# Patient Record
Sex: MALE | Race: WHITE | Employment: OTHER | URBAN - NONMETROPOLITAN AREA
[De-identification: names, ages, dates, MRNs, and addresses within clinical notes are randomized per-mention and may not be internally consistent; named-entity substitution may affect disease eponyms.]

---

## 2020-01-01 LAB
BASOPHILS ABSOLUTE: NORMAL
BASOPHILS RELATIVE PERCENT: NORMAL
EOSINOPHILS ABSOLUTE: NORMAL
EOSINOPHILS RELATIVE PERCENT: NORMAL
HCT VFR BLD CALC: NORMAL %
HEMOGLOBIN: NORMAL
LYMPHOCYTES ABSOLUTE: NORMAL
LYMPHOCYTES RELATIVE PERCENT: NORMAL
MCH RBC QN AUTO: NORMAL PG
MCHC RBC AUTO-ENTMCNC: NORMAL G/DL
MCV RBC AUTO: NORMAL FL
MONOCYTES ABSOLUTE: NORMAL
MONOCYTES RELATIVE PERCENT: NORMAL
NEUTROPHILS ABSOLUTE: NORMAL
NEUTROPHILS RELATIVE PERCENT: NORMAL
PLATELET # BLD: NORMAL 10*3/UL
PMV BLD AUTO: NORMAL FL
RBC # BLD: NORMAL 10*6/UL
WBC # BLD: NORMAL 10*3/UL

## 2020-07-15 LAB
AVERAGE GLUCOSE: 111
HBA1C MFR BLD: 5.5 %

## 2020-08-03 LAB
ALBUMIN SERPL-MCNC: NORMAL G/DL
ALP BLD-CCNC: NORMAL U/L
ALT SERPL-CCNC: NORMAL U/L
ANION GAP SERPL CALCULATED.3IONS-SCNC: NORMAL MMOL/L
AST SERPL-CCNC: NORMAL U/L
AVERAGE GLUCOSE: 140
BASOPHILS ABSOLUTE: NORMAL
BASOPHILS RELATIVE PERCENT: NORMAL
BILIRUB SERPL-MCNC: NORMAL MG/DL
BUN BLDV-MCNC: NORMAL MG/DL
CALCIUM SERPL-MCNC: NORMAL MG/DL
CHLORIDE BLD-SCNC: NORMAL MMOL/L
CO2: NORMAL
CREAT SERPL-MCNC: NORMAL MG/DL
EOSINOPHILS ABSOLUTE: NORMAL
EOSINOPHILS RELATIVE PERCENT: NORMAL
GFR CALCULATED: NORMAL
GLUCOSE BLD-MCNC: NORMAL MG/DL
HBA1C MFR BLD: 6.5 %
HCT VFR BLD CALC: NORMAL %
HEMOGLOBIN: NORMAL
LYMPHOCYTES ABSOLUTE: NORMAL
LYMPHOCYTES RELATIVE PERCENT: NORMAL
MCH RBC QN AUTO: NORMAL PG
MCHC RBC AUTO-ENTMCNC: NORMAL G/DL
MCV RBC AUTO: NORMAL FL
MONOCYTES ABSOLUTE: NORMAL
MONOCYTES RELATIVE PERCENT: NORMAL
NEUTROPHILS ABSOLUTE: NORMAL
NEUTROPHILS RELATIVE PERCENT: NORMAL
PLATELET # BLD: NORMAL 10*3/UL
PMV BLD AUTO: NORMAL FL
POTASSIUM SERPL-SCNC: NORMAL MMOL/L
RBC # BLD: NORMAL 10*6/UL
SODIUM BLD-SCNC: NORMAL MMOL/L
TOTAL PROTEIN: NORMAL
WBC # BLD: NORMAL 10*3/UL

## 2021-01-01 ENCOUNTER — TELEPHONE (OUTPATIENT)
Dept: FAMILY MEDICINE CLINIC | Age: 71
End: 2021-01-01

## 2021-01-01 ENCOUNTER — TELEPHONE (OUTPATIENT)
Dept: ADMINISTRATIVE | Age: 71
End: 2021-01-01

## 2021-01-01 ENCOUNTER — OFFICE VISIT (OUTPATIENT)
Dept: FAMILY MEDICINE CLINIC | Age: 71
End: 2021-01-01
Payer: MEDICARE

## 2021-01-01 ENCOUNTER — APPOINTMENT (OUTPATIENT)
Dept: ULTRASOUND IMAGING | Age: 71
DRG: 442 | End: 2021-01-01
Payer: MEDICARE

## 2021-01-01 ENCOUNTER — HOSPITAL ENCOUNTER (INPATIENT)
Age: 71
LOS: 3 days | Discharge: HOME HEALTH | DRG: 442 | End: 2021-04-15
Attending: EMERGENCY MEDICINE | Admitting: FAMILY MEDICINE
Payer: MEDICARE

## 2021-01-01 ENCOUNTER — CARE COORDINATION (OUTPATIENT)
Dept: CASE MANAGEMENT | Age: 71
End: 2021-01-01

## 2021-01-01 ENCOUNTER — IMMUNIZATION (OUTPATIENT)
Dept: PRIMARY CARE CLINIC | Age: 71
End: 2021-01-01
Payer: MEDICARE

## 2021-01-01 ENCOUNTER — APPOINTMENT (OUTPATIENT)
Dept: GENERAL RADIOLOGY | Age: 71
DRG: 442 | End: 2021-01-01
Payer: MEDICARE

## 2021-01-01 ENCOUNTER — APPOINTMENT (OUTPATIENT)
Dept: CT IMAGING | Age: 71
DRG: 442 | End: 2021-01-01
Payer: MEDICARE

## 2021-01-01 ENCOUNTER — OFFICE VISIT (OUTPATIENT)
Dept: PODIATRY | Age: 71
End: 2021-01-01
Payer: MEDICARE

## 2021-01-01 VITALS
BODY MASS INDEX: 27.85 KG/M2 | OXYGEN SATURATION: 94 % | TEMPERATURE: 97.3 F | DIASTOLIC BLOOD PRESSURE: 54 MMHG | HEART RATE: 68 BPM | SYSTOLIC BLOOD PRESSURE: 106 MMHG | HEIGHT: 72 IN | RESPIRATION RATE: 16 BRPM | WEIGHT: 205.6 LBS

## 2021-01-01 VITALS
HEART RATE: 58 BPM | DIASTOLIC BLOOD PRESSURE: 60 MMHG | SYSTOLIC BLOOD PRESSURE: 100 MMHG | WEIGHT: 213.7 LBS | RESPIRATION RATE: 16 BRPM | BODY MASS INDEX: 27.42 KG/M2 | HEIGHT: 74 IN | TEMPERATURE: 98.1 F | OXYGEN SATURATION: 97 %

## 2021-01-01 VITALS
OXYGEN SATURATION: 97 % | BODY MASS INDEX: 27.72 KG/M2 | DIASTOLIC BLOOD PRESSURE: 56 MMHG | WEIGHT: 216 LBS | TEMPERATURE: 97.2 F | SYSTOLIC BLOOD PRESSURE: 92 MMHG | HEART RATE: 56 BPM | HEIGHT: 74 IN

## 2021-01-01 VITALS
SYSTOLIC BLOOD PRESSURE: 114 MMHG | TEMPERATURE: 96.9 F | OXYGEN SATURATION: 98 % | WEIGHT: 225 LBS | BODY MASS INDEX: 29.82 KG/M2 | HEART RATE: 76 BPM | HEIGHT: 73 IN | DIASTOLIC BLOOD PRESSURE: 66 MMHG

## 2021-01-01 VITALS — HEIGHT: 74 IN | BODY MASS INDEX: 27.34 KG/M2 | WEIGHT: 213 LBS

## 2021-01-01 DIAGNOSIS — Z87.19 HISTORY OF CIRRHOSIS: ICD-10-CM

## 2021-01-01 DIAGNOSIS — E11.9 TYPE 2 DIABETES MELLITUS WITHOUT COMPLICATION, UNSPECIFIED WHETHER LONG TERM INSULIN USE (HCC): ICD-10-CM

## 2021-01-01 DIAGNOSIS — E11.42 TYPE 2 DIABETES MELLITUS WITH DIABETIC POLYNEUROPATHY, WITHOUT LONG-TERM CURRENT USE OF INSULIN (HCC): ICD-10-CM

## 2021-01-01 DIAGNOSIS — R18.8 OTHER ASCITES: Primary | ICD-10-CM

## 2021-01-01 DIAGNOSIS — B35.1 TINEA UNGUIUM: Primary | ICD-10-CM

## 2021-01-01 DIAGNOSIS — L84 CORNS AND CALLOSITIES: ICD-10-CM

## 2021-01-01 DIAGNOSIS — K74.60 ADVANCED CIRRHOSIS OF LIVER (HCC): Primary | ICD-10-CM

## 2021-01-01 DIAGNOSIS — K76.82 HEPATIC ENCEPHALOPATHY: Primary | ICD-10-CM

## 2021-01-01 DIAGNOSIS — M20.42 HAMMER TOES OF BOTH FEET: ICD-10-CM

## 2021-01-01 DIAGNOSIS — Z00.00 ROUTINE GENERAL MEDICAL EXAMINATION AT A HEALTH CARE FACILITY: Primary | ICD-10-CM

## 2021-01-01 DIAGNOSIS — K76.82 HEPATIC ENCEPHALOPATHY: ICD-10-CM

## 2021-01-01 DIAGNOSIS — G60.8 HEREDITARY SENSORY NEUROPATHY: ICD-10-CM

## 2021-01-01 DIAGNOSIS — B35.1 ONYCHOMYCOSIS: ICD-10-CM

## 2021-01-01 DIAGNOSIS — M20.41 HAMMER TOES OF BOTH FEET: ICD-10-CM

## 2021-01-01 DIAGNOSIS — Z91.81 AT HIGH RISK FOR FALLS: ICD-10-CM

## 2021-01-01 DIAGNOSIS — R18.8 OTHER ASCITES: ICD-10-CM

## 2021-01-01 DIAGNOSIS — E78.00 HYPERCHOLESTEROLEMIA: ICD-10-CM

## 2021-01-01 LAB
AFP-TUMOR MARKER: 3 NG/ML (ref 0–9)
ALBUMIN FLUID: 0.3 G/DL
ALBUMIN SERPL-MCNC: 2.6 G/DL (ref 3.5–5.2)
ALBUMIN SERPL-MCNC: 2.8 G/DL (ref 3.5–5.2)
ALBUMIN SERPL-MCNC: 2.8 G/DL (ref 3.5–5.2)
ALBUMIN SERPL-MCNC: 2.9 G/DL (ref 3.5–5.2)
ALBUMIN SERPL-MCNC: 3.1 G/DL (ref 3.5–5.2)
ALP BLD-CCNC: 172 U/L (ref 40–129)
ALP BLD-CCNC: 197 U/L (ref 40–129)
ALP BLD-CCNC: 214 U/L (ref 40–129)
ALP BLD-CCNC: 269 U/L (ref 40–129)
ALP BLD-CCNC: 315 U/L (ref 40–129)
ALT SERPL-CCNC: 21 U/L (ref 0–40)
ALT SERPL-CCNC: 21 U/L (ref 0–40)
ALT SERPL-CCNC: 27 U/L (ref 0–40)
ALT SERPL-CCNC: 30 U/L (ref 0–40)
ALT SERPL-CCNC: 32 U/L (ref 0–40)
AMMONIA: 30.2 UMOL/L (ref 16–60)
AMMONIA: 71.4 UMOL/L (ref 16–60)
AMMONIA: 75.8 UMOL/L (ref 16–60)
AMMONIA: 78 UMOL/L (ref 16–60)
AMMONIA: 80.9 UMOL/L (ref 16–60)
AMYLASE FLUID: 18 U/L
ANION GAP SERPL CALCULATED.3IONS-SCNC: 16 MMOL/L (ref 7–16)
ANION GAP SERPL CALCULATED.3IONS-SCNC: 7 MMOL/L (ref 7–16)
ANION GAP SERPL CALCULATED.3IONS-SCNC: 8 MMOL/L (ref 7–16)
ANION GAP SERPL CALCULATED.3IONS-SCNC: 9 MMOL/L (ref 7–16)
ANION GAP SERPL CALCULATED.3IONS-SCNC: 9 MMOL/L (ref 7–16)
ANISOCYTOSIS: ABNORMAL
APPEARANCE FLUID: CLEAR
APTT: 42.2 SEC (ref 24.5–35.1)
APTT: 45 SEC (ref 24.5–35.1)
APTT: 45.6 SEC (ref 24.5–35.1)
AST SERPL-CCNC: 28 U/L (ref 0–39)
AST SERPL-CCNC: 30 U/L (ref 0–39)
AST SERPL-CCNC: 35 U/L (ref 0–39)
AST SERPL-CCNC: 45 U/L (ref 0–39)
AST SERPL-CCNC: 53 U/L (ref 0–39)
BASOPHILS ABSOLUTE: 0.02 E9/L (ref 0–0.2)
BASOPHILS ABSOLUTE: 0.03 E9/L (ref 0–0.2)
BASOPHILS ABSOLUTE: 0.03 E9/L (ref 0–0.2)
BASOPHILS ABSOLUTE: 0.04 E9/L (ref 0–0.2)
BASOPHILS ABSOLUTE: 0.04 E9/L (ref 0–0.2)
BASOPHILS RELATIVE PERCENT: 0.5 % (ref 0–2)
BASOPHILS RELATIVE PERCENT: 0.5 % (ref 0–2)
BASOPHILS RELATIVE PERCENT: 0.6 % (ref 0–2)
BASOPHILS RELATIVE PERCENT: 0.7 % (ref 0–2)
BASOPHILS RELATIVE PERCENT: 1 % (ref 0–2)
BILIRUB SERPL-MCNC: 1.3 MG/DL (ref 0–1.2)
BILIRUB SERPL-MCNC: 1.7 MG/DL (ref 0–1.2)
BILIRUB SERPL-MCNC: 1.7 MG/DL (ref 0–1.2)
BILIRUB SERPL-MCNC: 1.8 MG/DL (ref 0–1.2)
BILIRUB SERPL-MCNC: 2 MG/DL (ref 0–1.2)
BILIRUBIN DIRECT: 0.4 MG/DL (ref 0–0.3)
BILIRUBIN DIRECT: 0.4 MG/DL (ref 0–0.3)
BILIRUBIN URINE: NEGATIVE
BILIRUBIN, INDIRECT: 1.3 MG/DL (ref 0–1)
BILIRUBIN, INDIRECT: 1.3 MG/DL (ref 0–1)
BLOOD CULTURE, ROUTINE: NORMAL
BLOOD, URINE: NEGATIVE
BUN BLDV-MCNC: 15 MG/DL (ref 8–23)
BUN BLDV-MCNC: 16 MG/DL (ref 8–23)
BUN BLDV-MCNC: 17 MG/DL (ref 8–23)
BUN BLDV-MCNC: 18 MG/DL (ref 8–23)
BUN BLDV-MCNC: 22 MG/DL (ref 6–23)
BUN BLDV-MCNC: NORMAL MG/DL
CALCIUM SERPL-MCNC: 8.5 MG/DL (ref 8.6–10.2)
CALCIUM SERPL-MCNC: 8.6 MG/DL (ref 8.6–10.2)
CALCIUM SERPL-MCNC: 8.7 MG/DL (ref 8.6–10.2)
CALCIUM SERPL-MCNC: 8.8 MG/DL (ref 8.6–10.2)
CALCIUM SERPL-MCNC: 9.2 MG/DL (ref 8.6–10.2)
CALCIUM SERPL-MCNC: NORMAL MG/DL
CEA,FLUID: 0.8 NG/ML
CELL COUNT FLUID TYPE: NORMAL
CHLORIDE BLD-SCNC: 102 MMOL/L (ref 98–107)
CHLORIDE BLD-SCNC: 102 MMOL/L (ref 98–107)
CHLORIDE BLD-SCNC: 103 MMOL/L (ref 98–107)
CHLORIDE BLD-SCNC: 96 MMOL/L (ref 98–107)
CHLORIDE BLD-SCNC: 97 MMOL/L (ref 98–107)
CHLORIDE BLD-SCNC: NORMAL MMOL/L
CHOLESTEROL, TOTAL: 140 MG/DL (ref 0–199)
CLARITY: CLEAR
CO2: 22 MMOL/L (ref 22–29)
CO2: 22 MMOL/L (ref 22–29)
CO2: 25 MMOL/L (ref 22–29)
CO2: 26 MMOL/L (ref 22–29)
CO2: 26 MMOL/L (ref 22–29)
CO2: NORMAL
COLOR FLUID: YELLOW
COLOR: YELLOW
CREAT SERPL-MCNC: 0.8 MG/DL (ref 0.7–1.2)
CREAT SERPL-MCNC: 0.9 MG/DL (ref 0.7–1.2)
CREAT SERPL-MCNC: 1 MG/DL (ref 0.7–1.2)
CREAT SERPL-MCNC: NORMAL MG/DL
CREATININE URINE: 33 MG/DL (ref 40–278)
CULTURE, BLOOD 2: NORMAL
EKG ATRIAL RATE: 80 BPM
EKG P AXIS: 11 DEGREES
EKG P-R INTERVAL: 158 MS
EKG Q-T INTERVAL: 376 MS
EKG QRS DURATION: 98 MS
EKG QTC CALCULATION (BAZETT): 433 MS
EKG R AXIS: -63 DEGREES
EKG T AXIS: 53 DEGREES
EKG VENTRICULAR RATE: 80 BPM
EOSINOPHILS ABSOLUTE: 0.04 E9/L (ref 0.05–0.5)
EOSINOPHILS ABSOLUTE: 0.09 E9/L (ref 0.05–0.5)
EOSINOPHILS ABSOLUTE: 0.09 E9/L (ref 0.05–0.5)
EOSINOPHILS ABSOLUTE: 0.1 E9/L (ref 0.05–0.5)
EOSINOPHILS ABSOLUTE: 0.16 E9/L (ref 0.05–0.5)
EOSINOPHILS RELATIVE PERCENT: 1 % (ref 0–6)
EOSINOPHILS RELATIVE PERCENT: 1.5 % (ref 0–6)
EOSINOPHILS RELATIVE PERCENT: 1.6 % (ref 0–6)
EOSINOPHILS RELATIVE PERCENT: 2.6 % (ref 0–6)
EOSINOPHILS RELATIVE PERCENT: 3.6 % (ref 0–6)
FLUID TYPE: NORMAL
GFR AFRICAN AMERICAN: >60
GFR CALCULATED: NORMAL
GFR NON-AFRICAN AMERICAN: >60 ML/MIN/1.73
GLUCOSE BLD-MCNC: 133 MG/DL (ref 74–99)
GLUCOSE BLD-MCNC: 142 MG/DL (ref 74–99)
GLUCOSE BLD-MCNC: 167 MG/DL (ref 74–99)
GLUCOSE BLD-MCNC: 172 MG/DL (ref 74–99)
GLUCOSE BLD-MCNC: 188 MG/DL (ref 74–99)
GLUCOSE BLD-MCNC: NORMAL MG/DL
GLUCOSE URINE: NEGATIVE MG/DL
HBA1C MFR BLD: 5 % (ref 4–5.6)
HCT VFR BLD CALC: 31.7 % (ref 37–54)
HCT VFR BLD CALC: 33.9 % (ref 37–54)
HCT VFR BLD CALC: 36.3 % (ref 37–54)
HCT VFR BLD CALC: 41.6 % (ref 37–54)
HCT VFR BLD CALC: 42.2 % (ref 37–54)
HCT VFR BLD CALC: NORMAL %
HDLC SERPL-MCNC: 49 MG/DL
HEMOGLOBIN: 10.7 G/DL (ref 12.5–16.5)
HEMOGLOBIN: 11.4 G/DL (ref 12.5–16.5)
HEMOGLOBIN: 12.4 G/DL (ref 12.5–16.5)
HEMOGLOBIN: 13.8 G/DL (ref 12.5–16.5)
HEMOGLOBIN: 14.3 G/DL (ref 12.5–16.5)
HEMOGLOBIN: NORMAL
IMMATURE GRANULOCYTES #: 0.01 E9/L
IMMATURE GRANULOCYTES #: 0.02 E9/L
IMMATURE GRANULOCYTES #: 0.03 E9/L
IMMATURE GRANULOCYTES #: 0.03 E9/L
IMMATURE GRANULOCYTES %: 0.3 % (ref 0–5)
IMMATURE GRANULOCYTES %: 0.5 % (ref 0–5)
INR BLD: 1.4
INR BLD: 1.7
INR BLD: 1.9
INR BLD: 1.9
KETONES, URINE: NEGATIVE MG/DL
LACTIC ACID: 1.6 MMOL/L (ref 0.5–2.2)
LACTIC ACID: 2.3 MMOL/L (ref 0.5–2.2)
LACTIC ACID: 2.9 MMOL/L (ref 0.5–2.2)
LDL CHOLESTEROL CALCULATED: 73 MG/DL (ref 0–99)
LEUKOCYTE ESTERASE, URINE: NEGATIVE
LYMPHOCYTES ABSOLUTE: 0.39 E9/L (ref 1.5–4)
LYMPHOCYTES ABSOLUTE: 0.6 E9/L (ref 1.5–4)
LYMPHOCYTES ABSOLUTE: 0.69 E9/L (ref 1.5–4)
LYMPHOCYTES ABSOLUTE: 0.72 E9/L (ref 1.5–4)
LYMPHOCYTES ABSOLUTE: 0.75 E9/L (ref 1.5–4)
LYMPHOCYTES RELATIVE PERCENT: 10 % (ref 20–42)
LYMPHOCYTES RELATIVE PERCENT: 12.2 % (ref 20–42)
LYMPHOCYTES RELATIVE PERCENT: 12.6 % (ref 20–42)
LYMPHOCYTES RELATIVE PERCENT: 15.7 % (ref 20–42)
LYMPHOCYTES RELATIVE PERCENT: 16.2 % (ref 20–42)
MCH RBC QN AUTO: 31.8 PG (ref 26–35)
MCH RBC QN AUTO: 32.2 PG (ref 26–35)
MCH RBC QN AUTO: 32.3 PG (ref 26–35)
MCH RBC QN AUTO: 32.6 PG (ref 26–35)
MCH RBC QN AUTO: 32.9 PG (ref 26–35)
MCHC RBC AUTO-ENTMCNC: 32.7 % (ref 32–34.5)
MCHC RBC AUTO-ENTMCNC: 33.6 % (ref 32–34.5)
MCHC RBC AUTO-ENTMCNC: 33.8 % (ref 32–34.5)
MCHC RBC AUTO-ENTMCNC: 34.2 % (ref 32–34.5)
MCHC RBC AUTO-ENTMCNC: 34.4 % (ref 32–34.5)
MCV RBC AUTO: 95.5 FL (ref 80–99.9)
MCV RBC AUTO: 95.5 FL (ref 80–99.9)
MCV RBC AUTO: 95.9 FL (ref 80–99.9)
MCV RBC AUTO: 96 FL (ref 80–99.9)
MCV RBC AUTO: 97.2 FL (ref 80–99.9)
METER GLUCOSE: 117 MG/DL (ref 74–99)
METER GLUCOSE: 119 MG/DL (ref 74–99)
METER GLUCOSE: 128 MG/DL (ref 74–99)
METER GLUCOSE: 133 MG/DL (ref 74–99)
METER GLUCOSE: 137 MG/DL (ref 74–99)
METER GLUCOSE: 142 MG/DL (ref 74–99)
METER GLUCOSE: 157 MG/DL (ref 74–99)
METER GLUCOSE: 159 MG/DL (ref 74–99)
METER GLUCOSE: 169 MG/DL (ref 74–99)
METER GLUCOSE: 213 MG/DL (ref 74–99)
MICROALBUMIN UR-MCNC: <12 MG/L
MICROALBUMIN/CREAT UR-RTO: ABNORMAL (ref 0–30)
MONOCYTE, FLUID: 99 %
MONOCYTES ABSOLUTE: 0.62 E9/L (ref 0.1–0.95)
MONOCYTES ABSOLUTE: 0.72 E9/L (ref 0.1–0.95)
MONOCYTES ABSOLUTE: 0.72 E9/L (ref 0.1–0.95)
MONOCYTES ABSOLUTE: 0.91 E9/L (ref 0.1–0.95)
MONOCYTES ABSOLUTE: 0.99 E9/L (ref 0.1–0.95)
MONOCYTES RELATIVE PERCENT: 16 % (ref 2–12)
MONOCYTES RELATIVE PERCENT: 16 % (ref 2–12)
MONOCYTES RELATIVE PERCENT: 16.2 % (ref 2–12)
MONOCYTES RELATIVE PERCENT: 16.7 % (ref 2–12)
MONOCYTES RELATIVE PERCENT: 18.9 % (ref 2–12)
NEUTROPHIL, FLUID: 1 %
NEUTROPHILS ABSOLUTE: 2.36 E9/L (ref 1.8–7.3)
NEUTROPHILS ABSOLUTE: 2.79 E9/L (ref 1.8–7.3)
NEUTROPHILS ABSOLUTE: 2.81 E9/L (ref 1.8–7.3)
NEUTROPHILS ABSOLUTE: 3.71 E9/L (ref 1.8–7.3)
NEUTROPHILS ABSOLUTE: 4.27 E9/L (ref 1.8–7.3)
NEUTROPHILS RELATIVE PERCENT: 62 % (ref 43–80)
NEUTROPHILS RELATIVE PERCENT: 62.8 % (ref 43–80)
NEUTROPHILS RELATIVE PERCENT: 68.1 % (ref 43–80)
NEUTROPHILS RELATIVE PERCENT: 69.2 % (ref 43–80)
NEUTROPHILS RELATIVE PERCENT: 72 % (ref 43–80)
NITRITE, URINE: NEGATIVE
NUCLEATED CELLS FLUID: 97 /UL
PDW BLD-RTO: 15.7 FL (ref 11.5–15)
PDW BLD-RTO: 16.5 FL (ref 11.5–15)
PDW BLD-RTO: 16.7 FL (ref 11.5–15)
PDW BLD-RTO: 16.7 FL (ref 11.5–15)
PDW BLD-RTO: 16.8 FL (ref 11.5–15)
PH UA: 5.5 (ref 5–9)
PLATELET # BLD: 118 E9/L (ref 130–450)
PLATELET # BLD: 119 E9/L (ref 130–450)
PLATELET # BLD: 71 E9/L (ref 130–450)
PLATELET # BLD: 76 E9/L (ref 130–450)
PLATELET # BLD: 82 E9/L (ref 130–450)
PLATELET # BLD: NORMAL 10*3/UL
PLATELET CONFIRMATION: NORMAL
PMV BLD AUTO: 10.1 FL (ref 7–12)
PMV BLD AUTO: 10.2 FL (ref 7–12)
PMV BLD AUTO: 10.4 FL (ref 7–12)
PMV BLD AUTO: 10.7 FL (ref 7–12)
PMV BLD AUTO: 11 FL (ref 7–12)
POTASSIUM REFLEX MAGNESIUM: 3.7 MMOL/L (ref 3.5–5)
POTASSIUM REFLEX MAGNESIUM: 3.7 MMOL/L (ref 3.5–5)
POTASSIUM REFLEX MAGNESIUM: 4 MMOL/L (ref 3.5–5)
POTASSIUM REFLEX MAGNESIUM: 4.2 MMOL/L (ref 3.5–5)
POTASSIUM SERPL-SCNC: 4.4 MMOL/L (ref 3.5–5)
POTASSIUM SERPL-SCNC: NORMAL MMOL/L
PROTEIN FLUID: 0.7 G/DL
PROTEIN UA: NEGATIVE MG/DL
PROTHROMBIN TIME: 18.4 SEC (ref 9.3–12.4)
PROTHROMBIN TIME: 20 SEC (ref 9.3–12.4)
PROTHROMBIN TIME: 20.7 SEC (ref 9.3–12.4)
PROTIME: 13.7 SECONDS
RBC # BLD: 3.32 E12/L (ref 3.8–5.8)
RBC # BLD: 3.53 E12/L (ref 3.8–5.8)
RBC # BLD: 3.8 E12/L (ref 3.8–5.8)
RBC # BLD: 4.34 E12/L (ref 3.8–5.8)
RBC # BLD: 4.34 E12/L (ref 3.8–5.8)
RBC # BLD: NORMAL 10*6/UL
RBC FLUID: <2000 /UL
SARS-COV-2: NEGATIVE
SARS-COV-2: NEGATIVE
SODIUM BLD-SCNC: 132 MMOL/L (ref 132–146)
SODIUM BLD-SCNC: 134 MMOL/L (ref 132–146)
SODIUM BLD-SCNC: 136 MMOL/L (ref 132–146)
SODIUM BLD-SCNC: NORMAL MMOL/L
SPECIFIC GRAVITY UA: 1.02 (ref 1–1.03)
TOTAL PROTEIN: 4.9 G/DL (ref 6.4–8.3)
TOTAL PROTEIN: 5 G/DL (ref 6.4–8.3)
TOTAL PROTEIN: 5.3 G/DL (ref 6.4–8.3)
TOTAL PROTEIN: 6.3 G/DL (ref 6.4–8.3)
TOTAL PROTEIN: 6.4 G/DL (ref 6.4–8.3)
TRIGL SERPL-MCNC: 91 MG/DL (ref 0–149)
TROPONIN: <0.01 NG/ML (ref 0–0.03)
URINE CULTURE, ROUTINE: NORMAL
UROBILINOGEN, URINE: 2 E.U./DL
VLDLC SERPL CALC-MCNC: 18 MG/DL
WBC # BLD: 3.8 E9/L (ref 4.5–11.5)
WBC # BLD: 3.9 E9/L (ref 4.5–11.5)
WBC # BLD: 4.4 E9/L (ref 4.5–11.5)
WBC # BLD: 5.5 E9/L (ref 4.5–11.5)
WBC # BLD: 6.2 E9/L (ref 4.5–11.5)
WBC # BLD: NORMAL 10*3/UL

## 2021-01-01 PROCEDURE — 80076 HEPATIC FUNCTION PANEL: CPT

## 2021-01-01 PROCEDURE — 1036F TOBACCO NON-USER: CPT | Performed by: FAMILY MEDICINE

## 2021-01-01 PROCEDURE — 80053 COMPREHEN METABOLIC PANEL: CPT

## 2021-01-01 PROCEDURE — 2580000003 HC RX 258: Performed by: FAMILY MEDICINE

## 2021-01-01 PROCEDURE — 88305 TISSUE EXAM BY PATHOLOGIST: CPT

## 2021-01-01 PROCEDURE — 99203 OFFICE O/P NEW LOW 30 MIN: CPT | Performed by: PODIATRIST

## 2021-01-01 PROCEDURE — 85730 THROMBOPLASTIN TIME PARTIAL: CPT

## 2021-01-01 PROCEDURE — 99222 1ST HOSP IP/OBS MODERATE 55: CPT | Performed by: FAMILY MEDICINE

## 2021-01-01 PROCEDURE — 6370000000 HC RX 637 (ALT 250 FOR IP): Performed by: FAMILY MEDICINE

## 2021-01-01 PROCEDURE — 4040F PNEUMOC VAC/ADMIN/RCVD: CPT | Performed by: PODIATRIST

## 2021-01-01 PROCEDURE — G8427 DOCREV CUR MEDS BY ELIG CLIN: HCPCS | Performed by: PODIATRIST

## 2021-01-01 PROCEDURE — 1111F DSCHRG MED/CURRENT MED MERGE: CPT | Performed by: FAMILY MEDICINE

## 2021-01-01 PROCEDURE — 1036F TOBACCO NON-USER: CPT | Performed by: PODIATRIST

## 2021-01-01 PROCEDURE — 89051 BODY FLUID CELL COUNT: CPT

## 2021-01-01 PROCEDURE — 82042 OTHER SOURCE ALBUMIN QUAN EA: CPT

## 2021-01-01 PROCEDURE — 1123F ACP DISCUSS/DSCN MKR DOCD: CPT | Performed by: FAMILY MEDICINE

## 2021-01-01 PROCEDURE — 11056 PARNG/CUTG B9 HYPRKR LES 2-4: CPT | Performed by: PODIATRIST

## 2021-01-01 PROCEDURE — G8417 CALC BMI ABV UP PARAM F/U: HCPCS | Performed by: FAMILY MEDICINE

## 2021-01-01 PROCEDURE — 82150 ASSAY OF AMYLASE: CPT

## 2021-01-01 PROCEDURE — 6360000002 HC RX W HCPCS: Performed by: CLINICAL NURSE SPECIALIST

## 2021-01-01 PROCEDURE — 0W9G3ZZ DRAINAGE OF PERITONEAL CAVITY, PERCUTANEOUS APPROACH: ICD-10-PCS | Performed by: RADIOLOGY

## 2021-01-01 PROCEDURE — 70450 CT HEAD/BRAIN W/O DYE: CPT

## 2021-01-01 PROCEDURE — 85025 COMPLETE CBC W/AUTO DIFF WBC: CPT

## 2021-01-01 PROCEDURE — 83605 ASSAY OF LACTIC ACID: CPT

## 2021-01-01 PROCEDURE — 36415 COLL VENOUS BLD VENIPUNCTURE: CPT

## 2021-01-01 PROCEDURE — 99232 SBSQ HOSP IP/OBS MODERATE 35: CPT | Performed by: FAMILY MEDICINE

## 2021-01-01 PROCEDURE — 82140 ASSAY OF AMMONIA: CPT

## 2021-01-01 PROCEDURE — 88112 CYTOPATH CELL ENHANCE TECH: CPT

## 2021-01-01 PROCEDURE — 4040F PNEUMOC VAC/ADMIN/RCVD: CPT | Performed by: FAMILY MEDICINE

## 2021-01-01 PROCEDURE — 3017F COLORECTAL CA SCREEN DOC REV: CPT | Performed by: PODIATRIST

## 2021-01-01 PROCEDURE — 3046F HEMOGLOBIN A1C LEVEL >9.0%: CPT | Performed by: PODIATRIST

## 2021-01-01 PROCEDURE — 87088 URINE BACTERIA CULTURE: CPT

## 2021-01-01 PROCEDURE — 85610 PROTHROMBIN TIME: CPT

## 2021-01-01 PROCEDURE — 91301 COVID-19, MODERNA VACCINE 100MCG/0.5ML DOSE: CPT | Performed by: NURSE PRACTITIONER

## 2021-01-01 PROCEDURE — 1111F DSCHRG MED/CURRENT MED MERGE: CPT | Performed by: PODIATRIST

## 2021-01-01 PROCEDURE — 0012A COVID-19, MODERNA VACCINE 100MCG/0.5ML DOSE: CPT | Performed by: NURSE PRACTITIONER

## 2021-01-01 PROCEDURE — 2022F DILAT RTA XM EVC RTNOPTHY: CPT | Performed by: PODIATRIST

## 2021-01-01 PROCEDURE — 2060000000 HC ICU INTERMEDIATE R&B

## 2021-01-01 PROCEDURE — 81003 URINALYSIS AUTO W/O SCOPE: CPT

## 2021-01-01 PROCEDURE — 6370000000 HC RX 637 (ALT 250 FOR IP): Performed by: CLINICAL NURSE SPECIALIST

## 2021-01-01 PROCEDURE — 76705 ECHO EXAM OF ABDOMEN: CPT

## 2021-01-01 PROCEDURE — C1729 CATH, DRAINAGE: HCPCS

## 2021-01-01 PROCEDURE — G8427 DOCREV CUR MEDS BY ELIG CLIN: HCPCS | Performed by: FAMILY MEDICINE

## 2021-01-01 PROCEDURE — 3017F COLORECTAL CA SCREEN DOC REV: CPT | Performed by: FAMILY MEDICINE

## 2021-01-01 PROCEDURE — 82962 GLUCOSE BLOOD TEST: CPT

## 2021-01-01 PROCEDURE — P9047 ALBUMIN (HUMAN), 25%, 50ML: HCPCS | Performed by: CLINICAL NURSE SPECIALIST

## 2021-01-01 PROCEDURE — P9612 CATHETERIZE FOR URINE SPEC: HCPCS

## 2021-01-01 PROCEDURE — 1123F ACP DISCUSS/DSCN MKR DOCD: CPT | Performed by: PODIATRIST

## 2021-01-01 PROCEDURE — 99238 HOSP IP/OBS DSCHRG MGMT 30/<: CPT | Performed by: FAMILY MEDICINE

## 2021-01-01 PROCEDURE — 6370000000 HC RX 637 (ALT 250 FOR IP): Performed by: STUDENT IN AN ORGANIZED HEALTH CARE EDUCATION/TRAINING PROGRAM

## 2021-01-01 PROCEDURE — 0011A COVID-19, MODERNA VACCINE 100MCG/0.5ML DOSE: CPT | Performed by: NURSE PRACTITIONER

## 2021-01-01 PROCEDURE — 84157 ASSAY OF PROTEIN OTHER: CPT

## 2021-01-01 PROCEDURE — 99282 EMERGENCY DEPT VISIT SF MDM: CPT

## 2021-01-01 PROCEDURE — 99204 OFFICE O/P NEW MOD 45 MIN: CPT | Performed by: FAMILY MEDICINE

## 2021-01-01 PROCEDURE — 2022F DILAT RTA XM EVC RTNOPTHY: CPT | Performed by: FAMILY MEDICINE

## 2021-01-01 PROCEDURE — G0438 PPPS, INITIAL VISIT: HCPCS | Performed by: FAMILY MEDICINE

## 2021-01-01 PROCEDURE — 82105 ALPHA-FETOPROTEIN SERUM: CPT

## 2021-01-01 PROCEDURE — 93005 ELECTROCARDIOGRAM TRACING: CPT | Performed by: STUDENT IN AN ORGANIZED HEALTH CARE EDUCATION/TRAINING PROGRAM

## 2021-01-01 PROCEDURE — 3046F HEMOGLOBIN A1C LEVEL >9.0%: CPT | Performed by: FAMILY MEDICINE

## 2021-01-01 PROCEDURE — 71045 X-RAY EXAM CHEST 1 VIEW: CPT

## 2021-01-01 PROCEDURE — 82378 CARCINOEMBRYONIC ANTIGEN: CPT

## 2021-01-01 PROCEDURE — 11721 DEBRIDE NAIL 6 OR MORE: CPT | Performed by: PODIATRIST

## 2021-01-01 PROCEDURE — 87040 BLOOD CULTURE FOR BACTERIA: CPT

## 2021-01-01 PROCEDURE — 84484 ASSAY OF TROPONIN QUANT: CPT

## 2021-01-01 PROCEDURE — 99495 TRANSJ CARE MGMT MOD F2F 14D: CPT | Performed by: FAMILY MEDICINE

## 2021-01-01 PROCEDURE — G8417 CALC BMI ABV UP PARAM F/U: HCPCS | Performed by: PODIATRIST

## 2021-01-01 RX ORDER — ACETAMINOPHEN 650 MG/1
650 SUPPOSITORY RECTAL EVERY 6 HOURS PRN
Status: DISCONTINUED | OUTPATIENT
Start: 2021-01-01 | End: 2021-01-01 | Stop reason: HOSPADM

## 2021-01-01 RX ORDER — OMEPRAZOLE 20 MG/1
20 CAPSULE, DELAYED RELEASE ORAL DAILY
COMMUNITY
Start: 2021-01-01

## 2021-01-01 RX ORDER — FUROSEMIDE 40 MG/1
40 TABLET ORAL EVERY MORNING
Status: DISCONTINUED | OUTPATIENT
Start: 2021-01-01 | End: 2021-01-01 | Stop reason: HOSPADM

## 2021-01-01 RX ORDER — LACTULOSE 10 G/15ML
20 SOLUTION ORAL ONCE
Status: COMPLETED | OUTPATIENT
Start: 2021-01-01 | End: 2021-01-01

## 2021-01-01 RX ORDER — SODIUM CHLORIDE 0.9 % (FLUSH) 0.9 %
5-40 SYRINGE (ML) INJECTION PRN
Status: DISCONTINUED | OUTPATIENT
Start: 2021-01-01 | End: 2021-01-01 | Stop reason: HOSPADM

## 2021-01-01 RX ORDER — ATORVASTATIN CALCIUM 40 MG/1
40 TABLET, FILM COATED ORAL DAILY
Qty: 90 TABLET | Refills: 2 | Status: SHIPPED | OUTPATIENT
Start: 2021-01-01 | End: 2021-01-01

## 2021-01-01 RX ORDER — ATORVASTATIN CALCIUM 40 MG/1
40 TABLET, FILM COATED ORAL DAILY
COMMUNITY
Start: 2021-01-01 | Stop reason: SDUPTHER

## 2021-01-01 RX ORDER — LACTULOSE 10 G/15ML
30 SOLUTION ORAL EVERY 6 HOURS SCHEDULED
Status: DISCONTINUED | OUTPATIENT
Start: 2021-01-01 | End: 2021-01-01 | Stop reason: HOSPADM

## 2021-01-01 RX ORDER — FUROSEMIDE 20 MG/1
20 TABLET ORAL NIGHTLY
Status: DISCONTINUED | OUTPATIENT
Start: 2021-01-01 | End: 2021-01-01 | Stop reason: HOSPADM

## 2021-01-01 RX ORDER — SODIUM CHLORIDE 0.9 % (FLUSH) 0.9 %
5-40 SYRINGE (ML) INJECTION EVERY 12 HOURS SCHEDULED
Status: DISCONTINUED | OUTPATIENT
Start: 2021-01-01 | End: 2021-01-01 | Stop reason: HOSPADM

## 2021-01-01 RX ORDER — FUROSEMIDE 40 MG/1
40 TABLET ORAL DAILY
COMMUNITY
Start: 2020-01-01

## 2021-01-01 RX ORDER — ACETAMINOPHEN 325 MG/1
650 TABLET ORAL EVERY 6 HOURS PRN
Status: DISCONTINUED | OUTPATIENT
Start: 2021-01-01 | End: 2021-01-01 | Stop reason: HOSPADM

## 2021-01-01 RX ORDER — LACTULOSE 10 G/15ML
SOLUTION ORAL
Qty: 3 BOTTLE | Refills: 2 | Status: ON HOLD
Start: 2021-01-01 | End: 2021-01-01 | Stop reason: SDUPTHER

## 2021-01-01 RX ORDER — PANTOPRAZOLE SODIUM 40 MG/1
40 TABLET, DELAYED RELEASE ORAL
Status: DISCONTINUED | OUTPATIENT
Start: 2021-01-01 | End: 2021-01-01 | Stop reason: HOSPADM

## 2021-01-01 RX ORDER — GLIPIZIDE 10 MG/1
10 TABLET, FILM COATED, EXTENDED RELEASE ORAL
Qty: 90 TABLET | Refills: 1 | Status: SHIPPED
Start: 2021-01-01 | End: 2021-01-01

## 2021-01-01 RX ORDER — LACTULOSE 10 G/15ML
SOLUTION ORAL
Qty: 3 BOTTLE | Refills: 2 | Status: SHIPPED
Start: 2021-01-01 | End: 2021-01-01

## 2021-01-01 RX ORDER — PROMETHAZINE HYDROCHLORIDE 25 MG/1
12.5 TABLET ORAL EVERY 6 HOURS PRN
Status: DISCONTINUED | OUTPATIENT
Start: 2021-01-01 | End: 2021-01-01 | Stop reason: HOSPADM

## 2021-01-01 RX ORDER — GLIPIZIDE 5 MG/1
5 TABLET, FILM COATED, EXTENDED RELEASE ORAL
Qty: 90 TABLET | Refills: 0 | Status: SHIPPED
Start: 2021-01-01 | End: 2021-01-01

## 2021-01-01 RX ORDER — NADOLOL 20 MG/1
20 TABLET ORAL NIGHTLY
COMMUNITY
Start: 2021-01-01

## 2021-01-01 RX ORDER — GLIPIZIDE 5 MG/1
TABLET, FILM COATED, EXTENDED RELEASE ORAL
Qty: 90 TABLET | Refills: 3 | Status: SHIPPED | OUTPATIENT
Start: 2021-01-01

## 2021-01-01 RX ORDER — ATORVASTATIN CALCIUM 40 MG/1
40 TABLET, FILM COATED ORAL DAILY
Qty: 30 TABLET | Refills: 2 | Status: SHIPPED
Start: 2021-01-01 | End: 2021-01-01 | Stop reason: SDUPTHER

## 2021-01-01 RX ORDER — LACTULOSE 10 G/15ML
SOLUTION ORAL
Qty: 3 BOTTLE | Refills: 2 | Status: SHIPPED | OUTPATIENT
Start: 2021-01-01 | End: 2021-01-01 | Stop reason: SDUPTHER

## 2021-01-01 RX ORDER — NADOLOL 20 MG/1
20 TABLET ORAL NIGHTLY
Status: DISCONTINUED | OUTPATIENT
Start: 2021-01-01 | End: 2021-01-01 | Stop reason: HOSPADM

## 2021-01-01 RX ORDER — LACTULOSE 10 G/15ML
SOLUTION ORAL
COMMUNITY
Start: 2021-01-01 | End: 2021-01-01 | Stop reason: SDUPTHER

## 2021-01-01 RX ORDER — GLIPIZIDE 10 MG/1
10 TABLET, FILM COATED, EXTENDED RELEASE ORAL
Qty: 30 TABLET | Refills: 2
Start: 2021-01-01 | End: 2021-01-01 | Stop reason: SDUPTHER

## 2021-01-01 RX ORDER — LACTULOSE 10 G/15ML
30 SOLUTION ORAL 3 TIMES DAILY
Status: DISCONTINUED | OUTPATIENT
Start: 2021-01-01 | End: 2021-01-01

## 2021-01-01 RX ORDER — FUROSEMIDE 20 MG/1
20 TABLET ORAL DAILY
COMMUNITY
Start: 2021-01-01

## 2021-01-01 RX ORDER — ERGOCALCIFEROL (VITAMIN D2) 1250 MCG
50000 CAPSULE ORAL WEEKLY
COMMUNITY
Start: 2021-01-01

## 2021-01-01 RX ORDER — LACTULOSE 10 G/15ML
30 SOLUTION ORAL ONCE
Status: COMPLETED | OUTPATIENT
Start: 2021-01-01 | End: 2021-01-01

## 2021-01-01 RX ORDER — ONDANSETRON 2 MG/ML
4 INJECTION INTRAMUSCULAR; INTRAVENOUS EVERY 6 HOURS PRN
Status: DISCONTINUED | OUTPATIENT
Start: 2021-01-01 | End: 2021-01-01 | Stop reason: HOSPADM

## 2021-01-01 RX ORDER — GLIPIZIDE 10 MG/1
10 TABLET, FILM COATED, EXTENDED RELEASE ORAL 2 TIMES DAILY
COMMUNITY
Start: 2020-04-28 | End: 2021-01-01

## 2021-01-01 RX ORDER — LACTULOSE 10 G/15ML
SOLUTION ORAL
Qty: 8295 ML | Refills: 3 | Status: SHIPPED | OUTPATIENT
Start: 2021-01-01

## 2021-01-01 RX ORDER — SPIRONOLACTONE 50 MG/1
150 TABLET, FILM COATED ORAL DAILY
COMMUNITY
Start: 2021-01-01

## 2021-01-01 RX ORDER — SPIRONOLACTONE 25 MG/1
150 TABLET ORAL DAILY
Status: DISCONTINUED | OUTPATIENT
Start: 2021-01-01 | End: 2021-01-01 | Stop reason: HOSPADM

## 2021-01-01 RX ORDER — ATORVASTATIN CALCIUM 40 MG/1
40 TABLET, FILM COATED ORAL DAILY
Status: DISCONTINUED | OUTPATIENT
Start: 2021-01-01 | End: 2021-01-01 | Stop reason: HOSPADM

## 2021-01-01 RX ORDER — SODIUM CHLORIDE 9 MG/ML
25 INJECTION, SOLUTION INTRAVENOUS PRN
Status: DISCONTINUED | OUTPATIENT
Start: 2021-01-01 | End: 2021-01-01 | Stop reason: HOSPADM

## 2021-01-01 RX ORDER — 0.9 % SODIUM CHLORIDE 0.9 %
500 INTRAVENOUS SOLUTION INTRAVENOUS ONCE
Status: COMPLETED | OUTPATIENT
Start: 2021-01-01 | End: 2021-01-01

## 2021-01-01 RX ORDER — LACTULOSE 10 G/15ML
SOLUTION ORAL
Qty: 8295 ML | Refills: 3 | Status: SHIPPED
Start: 2021-01-01 | End: 2021-01-01 | Stop reason: SDUPTHER

## 2021-01-01 RX ORDER — POLYETHYLENE GLYCOL 3350 17 G/17G
17 POWDER, FOR SOLUTION ORAL DAILY PRN
Status: DISCONTINUED | OUTPATIENT
Start: 2021-01-01 | End: 2021-01-01 | Stop reason: HOSPADM

## 2021-01-01 RX ORDER — ALBUMIN (HUMAN) 12.5 G/50ML
75 SOLUTION INTRAVENOUS SEE ADMIN INSTRUCTIONS
Status: COMPLETED | OUTPATIENT
Start: 2021-01-01 | End: 2021-01-01

## 2021-01-01 RX ADMIN — LACTULOSE 30 G: 20 SOLUTION ORAL at 05:14

## 2021-01-01 RX ADMIN — LACTULOSE 30 G: 20 SOLUTION ORAL at 06:02

## 2021-01-01 RX ADMIN — RIFAXIMIN 550 MG: 550 TABLET ORAL at 08:47

## 2021-01-01 RX ADMIN — RIFAXIMIN 550 MG: 550 TABLET ORAL at 20:48

## 2021-01-01 RX ADMIN — LACTULOSE 30 G: 20 SOLUTION ORAL at 23:36

## 2021-01-01 RX ADMIN — LACTULOSE 30 G: 20 SOLUTION ORAL at 17:27

## 2021-01-01 RX ADMIN — LACTULOSE 30 G: 20 SOLUTION ORAL at 23:21

## 2021-01-01 RX ADMIN — FUROSEMIDE 40 MG: 40 TABLET ORAL at 09:18

## 2021-01-01 RX ADMIN — ATORVASTATIN CALCIUM 40 MG: 40 TABLET, FILM COATED ORAL at 20:00

## 2021-01-01 RX ADMIN — SODIUM CHLORIDE 500 ML: 9 INJECTION, SOLUTION INTRAVENOUS at 02:14

## 2021-01-01 RX ADMIN — PANTOPRAZOLE SODIUM 40 MG: 40 TABLET, DELAYED RELEASE ORAL at 05:14

## 2021-01-01 RX ADMIN — SPIRONOLACTONE 150 MG: 25 TABLET ORAL at 02:14

## 2021-01-01 RX ADMIN — ATORVASTATIN CALCIUM 40 MG: 40 TABLET, FILM COATED ORAL at 20:47

## 2021-01-01 RX ADMIN — RIFAXIMIN 550 MG: 550 TABLET ORAL at 20:00

## 2021-01-01 RX ADMIN — LACTULOSE 20 G: 20 SOLUTION ORAL at 20:52

## 2021-01-01 RX ADMIN — LACTULOSE 30 G: 20 SOLUTION ORAL at 17:07

## 2021-01-01 RX ADMIN — FUROSEMIDE 20 MG: 20 TABLET ORAL at 20:48

## 2021-01-01 RX ADMIN — SODIUM CHLORIDE, PRESERVATIVE FREE 10 ML: 5 INJECTION INTRAVENOUS at 10:27

## 2021-01-01 RX ADMIN — SODIUM CHLORIDE, PRESERVATIVE FREE 10 ML: 5 INJECTION INTRAVENOUS at 20:01

## 2021-01-01 RX ADMIN — LACTULOSE 30 G: 20 SOLUTION ORAL at 17:40

## 2021-01-01 RX ADMIN — ALBUMIN (HUMAN) 75 G: 0.25 INJECTION, SOLUTION INTRAVENOUS at 14:53

## 2021-01-01 RX ADMIN — LACTULOSE 30 G: 20 SOLUTION ORAL at 11:39

## 2021-01-01 RX ADMIN — NADOLOL 20 MG: 20 TABLET ORAL at 20:00

## 2021-01-01 RX ADMIN — SPIRONOLACTONE 150 MG: 25 TABLET ORAL at 10:46

## 2021-01-01 RX ADMIN — FUROSEMIDE 40 MG: 40 TABLET ORAL at 10:27

## 2021-01-01 RX ADMIN — PANTOPRAZOLE SODIUM 40 MG: 40 TABLET, DELAYED RELEASE ORAL at 06:02

## 2021-01-01 RX ADMIN — LACTULOSE 30 G: 20 SOLUTION ORAL at 11:53

## 2021-01-01 RX ADMIN — PANTOPRAZOLE SODIUM 40 MG: 40 TABLET, DELAYED RELEASE ORAL at 05:05

## 2021-01-01 RX ADMIN — FUROSEMIDE 40 MG: 40 TABLET ORAL at 08:47

## 2021-01-01 RX ADMIN — LACTULOSE 30 G: 20 SOLUTION ORAL at 08:47

## 2021-01-01 RX ADMIN — SPIRONOLACTONE 150 MG: 25 TABLET ORAL at 10:55

## 2021-01-01 RX ADMIN — SODIUM CHLORIDE, PRESERVATIVE FREE 10 ML: 5 INJECTION INTRAVENOUS at 20:48

## 2021-01-01 RX ADMIN — RIFAXIMIN 550 MG: 550 TABLET ORAL at 09:18

## 2021-01-01 RX ADMIN — RIFAXIMIN 550 MG: 550 TABLET ORAL at 10:26

## 2021-01-01 RX ADMIN — SODIUM CHLORIDE, PRESERVATIVE FREE 10 ML: 5 INJECTION INTRAVENOUS at 09:19

## 2021-01-01 RX ADMIN — NADOLOL 20 MG: 20 TABLET ORAL at 20:48

## 2021-01-01 RX ADMIN — LACTULOSE 30 G: 20 SOLUTION ORAL at 11:09

## 2021-01-01 RX ADMIN — FUROSEMIDE 20 MG: 20 TABLET ORAL at 20:00

## 2021-01-01 SDOH — ECONOMIC STABILITY: FOOD INSECURITY: WITHIN THE PAST 12 MONTHS, YOU WORRIED THAT YOUR FOOD WOULD RUN OUT BEFORE YOU GOT MONEY TO BUY MORE.: NEVER TRUE

## 2021-01-01 SDOH — ECONOMIC STABILITY: FOOD INSECURITY: WITHIN THE PAST 12 MONTHS, THE FOOD YOU BOUGHT JUST DIDN'T LAST AND YOU DIDN'T HAVE MONEY TO GET MORE.: NEVER TRUE

## 2021-01-01 ASSESSMENT — ENCOUNTER SYMPTOMS
RHINORRHEA: 0
CHEST TIGHTNESS: 0
NAUSEA: 0
CONSTIPATION: 0
DIARRHEA: 0
RHINORRHEA: 0
CONSTIPATION: 0
SHORTNESS OF BREATH: 0
COUGH: 0
SORE THROAT: 0
NAUSEA: 0
DIARRHEA: 0
ABDOMINAL PAIN: 0
COUGH: 0
ABDOMINAL PAIN: 0
VOMITING: 0
VOMITING: 0
SHORTNESS OF BREATH: 0
CHEST TIGHTNESS: 0
SORE THROAT: 0

## 2021-01-01 ASSESSMENT — PATIENT HEALTH QUESTIONNAIRE - PHQ9
1. LITTLE INTEREST OR PLEASURE IN DOING THINGS: 0
SUM OF ALL RESPONSES TO PHQ9 QUESTIONS 1 & 2: 0
SUM OF ALL RESPONSES TO PHQ QUESTIONS 1-9: 0
1. LITTLE INTEREST OR PLEASURE IN DOING THINGS: 1
SUM OF ALL RESPONSES TO PHQ QUESTIONS 1-9: 2
SUM OF ALL RESPONSES TO PHQ QUESTIONS 1-9: 0
SUM OF ALL RESPONSES TO PHQ QUESTIONS 1-9: 0
2. FEELING DOWN, DEPRESSED OR HOPELESS: 0

## 2021-01-01 ASSESSMENT — PAIN SCALES - GENERAL
PAINLEVEL_OUTOF10: 0

## 2021-01-01 ASSESSMENT — SOCIAL DETERMINANTS OF HEALTH (SDOH): HOW HARD IS IT FOR YOU TO PAY FOR THE VERY BASICS LIKE FOOD, HOUSING, MEDICAL CARE, AND HEATING?: NOT HARD AT ALL

## 2021-01-01 ASSESSMENT — LIFESTYLE VARIABLES: HOW OFTEN DO YOU HAVE A DRINK CONTAINING ALCOHOL: 0

## 2021-03-12 NOTE — TELEPHONE ENCOUNTER
Wife Branden Khan is your patient, they would like to establish patient with you, please advise if you can accept. Per grandson[facility], does have some health issues.

## 2021-04-02 PROBLEM — E11.42 TYPE 2 DIABETES MELLITUS WITH DIABETIC POLYNEUROPATHY, WITHOUT LONG-TERM CURRENT USE OF INSULIN (HCC): Status: ACTIVE | Noted: 2021-01-01

## 2021-04-02 PROBLEM — R41.89 COGNITIVE IMPAIRMENT: Status: ACTIVE | Noted: 2020-01-01

## 2021-04-02 PROBLEM — K76.82 HEPATIC ENCEPHALOPATHY: Status: ACTIVE | Noted: 2021-01-01

## 2021-04-02 PROBLEM — E11.9 TYPE 2 DIABETES MELLITUS WITHOUT COMPLICATION, WITHOUT LONG-TERM CURRENT USE OF INSULIN (HCC): Status: ACTIVE | Noted: 2021-01-01

## 2021-04-02 PROBLEM — E78.00 HYPERCHOLESTEROLEMIA: Status: ACTIVE | Noted: 2021-01-01

## 2021-04-02 PROBLEM — K74.60 ADVANCED CIRRHOSIS OF LIVER (HCC): Status: ACTIVE | Noted: 2021-01-01

## 2021-04-02 PROBLEM — I63.9 CRYPTOGENIC STROKE (HCC): Status: ACTIVE | Noted: 2020-01-01

## 2021-04-02 NOTE — PROGRESS NOTES
[facility]  Office Progress Note - Dr. Patric Gilbert  4/2/21    CC:   Chief Complaint   Patient presents with    New Patient        HPI: presents to est  Was admitted to [facility] with liver failure and high ammonia. Inciting event was a fall at home in the bathroom in the middle of the night. He improved and was in acute rehab for a week or so  Then discharged home for 10 days or so, but hasnt had any home health arrranged by former PCP yet, for whatever reason. His lactuolose level was increased in hospital and this weemed to work well but has had decreased effectiveness since he has been eating less upon coming home. Unable to start rifaximin due to cost.  It sounds like a  is working on this for them. He does have some confusion and Ahmet Grewal is a man of few words. \"    He is nearing the possobility of  A liver transplant. Wife thinks that he seems stronger since coming home from hospital.   Less confused than baseline, but still a little confused. Gets paracentesis about weekly since June of 2020, and more recently decreased to [address]. This time less than 4 liters, less than normal.     Hill Liu is Dr Heather Marshall at [facility]. Dr. Sukhdev Jones in [place] is maybe local gastro but never actually seen him. Has seen Omid See ([facility]). Would like home health from [facility]  PT, OT, nursing. Ferritin and vitamin A?? 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 _________________________________________________________    Assessment / Nerissa Martinez was seen today for new patient. Diagnoses and all orders for this visit:    Advanced cirrhosis of liver [facility])  -     [facility]    Hepatic encephalopathy [facility])  -     [address] [facility] with current medication regimen for this problem. No changes made. Pursuing a liver transplant list.  Working with [facility] clinic.   He has a colonoscopy to get done in 1 other evaluation. And he will be on transplant list if everything goes according to plan. His wife thinks that he has been more clear since returning home from acute rehab, but he never had home health started or home therapy. We will get that ordered for him today. Ideally we can get him on some rifaximin, but there seems to be a financial barrier right now with insurance. Type 2 diabetes mellitus with diabetic polyneuropathy, without long-term current use of insulin ([facility])  Last A1c in July was 5.5. I am not comfortable with that with the current dose of glipizide XL 10 mg twice daily. I am going to decrease that to once daily. He has also been eating less recently. Weight has trended down over time. -     glipiZIDE (GLUCOTROL XL) 10 MG extended release tablet; Take 1 tablet by mouth daily (with breakfast)    At high risk for falls  Home physical therapy ordered. 3 months or as scheduled. Sooner as needed if any new problems arise  Patient counseled to follow up sooner or seek more acute care if symptoms worsening or not improving according to plan.      Electronically signed by Ayaz Bautista MD on 4/2/2021    _________________________________________________________  Current Outpatient Medications on File Prior to Visit   Medication Sig Dispense Refill    furosemide (LASIX) 40 MG tablet       furosemide (LASIX) 20 MG tablet       spironolactone (ALDACTONE) 50 MG tablet Take 150 mg by mouth daily      omeprazole (PRILOSEC) 20 MG delayed release capsule Take 20 mg by mouth daily      atorvastatin (LIPITOR) 40 MG tablet Take 40 mg by mouth daily      ergocalciferol (ERGOCALCIFEROL) 1.25 MG (98977 UT) capsule Take 50,000 Units by mouth once a week      nadolol (CORGARD) 20 MG tablet Take 20 mg by mouth nightly      CONSTULOSE 10 GM/15ML solution Wife reports giving 30 mL TID      vitamin A 3 MG (84765 UT) capsule Take 10,000 Units by mouth daily       No current facility-administered medications on file prior to visit. Patient Active Problem List   Diagnosis Code    Type 2 diabetes mellitus with diabetic polyneuropathy, without long-term current use of insulin (HCC) E11.42    Hepatic encephalopathy ([facility] K72.90    Advanced cirrhosis of liver (HCC) K74.60    Hypercholesterolemia E78.00    [facility] I63.9    Cognitive impairment R41.89     _________________________________________________________  Past Medical History:   Diagnosis Date    Liver problem        Family History   Problem Relation Age of Onset    Breast Cancer Mother     Heart Disease Mother     Emphysema Mother     Hemochromatosis Brother        Past Surgical History:   Procedure Laterality Date    EYE SURGERY      HERNIA REPAIR         Social History     Tobacco Use    Smoking status: Never Smoker    Smokeless tobacco: Never Used   Substance Use Topics    Alcohol use: Never     Frequency: Never     Binge frequency: Never    Drug use: Never       Chart reviewed and updated where appropriate for PMH, Fam, and Soc Hx.  _________________________________________________________  ROS: POSITIVE: As in the HPI. Otherwise Pertinent negatives are negative.    __________________________________________________________  Physical Exam   /66 (Site: Left Upper Arm, Position: Sitting, Cuff Size: Large Adult)   Pulse 76   Temp 96.9 °F (36.1 °C) (Temporal)   Ht 6' 0.5\" (1.842 m)   Wt 225 lb (102.1 kg)   SpO2 98%   BMI 30.10 kg/m²   Wt Readings from Last 3 Encounters:   04/02/21 225 lb (102.1 kg)       Constitutional:    He is oriented to person, place, and time. He appears well-developed and well-nourished. HENT:    Nose: Nose normal.    Mouth/Throat: Oropharynx is clear and moist.   Eyes:    Conjunctivae are normal.    Pupils are equal, round, and reactive to light. EOMI. Neck:    Normal range of motion. No thyromegaly or nodules noted. No bruit. Cardiovascular:    Normal rate, regular rhythm and normal heart sounds. No murmur. No gallop and no friction rub. Pulmonary/Chest:    Effort normal and breath sounds normal.    No wheezes. No rales or rhonchi. Abdominal:    Soft. Bowel sounds are normal.    No distension. No tenderness. Musculoskeletal:    Normal range of motion. No joint swelling noted. No peripheral edema. Skin:    Skin is warm and dry. No rashes, lesions. Jaundiced. Bruised. Capillary fragility. Psychiatric: Mildly confused. Wife answers most questions. He does answer questions directed to him accurately. Slow to answer. He has a normal mood and affect. Normal groom and dress. No SI or HI.   ________________________________________________________    This note may have been created using dictation software. Efforts were made to reduce grammatical or syntax errors, but some may persist.   On the basis of positive falls risk screening, assessment and plan is as follows: Home therapy ordered.

## 2021-04-08 NOTE — TELEPHONE ENCOUNTER
Shen states the soonest they can see patient is Sunday 04/11/21. Verbal Order given to start care that day. I confirmed with wife Chen Weber -- [facility] call and set up appt for Jr Will for Sunday.

## 2021-04-08 NOTE — TELEPHONE ENCOUNTER
Spoke with Dee [facility]ias home care referrals department. She is checking with her manager for availability and when they can get patient on the schedule to be seen for home care. Latrel to call us back and let us know. Pt or wife [facility] will need updated.

## 2021-04-08 NOTE — TELEPHONE ENCOUNTER
*Wife Soy Bliss states they have not heard anything from home health referral for Мария Mcgee I will be calling on that. *She would like a referral for Мария Mcgee and herself to a podiatrist here in the office. She states that they both are diabetic and need their toenails cut.     Yuko Elaine also wanted to update you that hopefully Мария Mcgee will be starting rafaximin soon as they are filling paperwork out to get the drug at no cost.

## 2021-04-09 NOTE — TELEPHONE ENCOUNTER
Last Appointment:  4/2/2021  Future Appointments   Date Time Provider Tommie Ortiz   4/20/2021  1:30 PM Brett Kim DPM [facility]   7/2/2021  2:00 PM Nuno Logan MD [facility]      [facility] Requesting refills on pended meds. Please review quantity/refills specifically on constulose. I spoke with wife Kelsey Goncalves, she reports giving Naveen 30mL of Constulose 2-3 times daily.

## 2021-04-12 NOTE — TELEPHONE ENCOUNTER
[address]      She is calling to find out if you want an ammonia level drawn on this patient?

## 2021-04-12 NOTE — ED PROVIDER NOTES
Patient is a 76-year-old male who presents to the emergency department for reported confusion. Patient is PAEZ x2 on initial evaluation, poor historian, not able to follow questioning. Patient does not complain of pain. This is reportedly an acute change. Patient reportedly was sent in by PCP after noted confusion at his office this morning. Report from Dr. Ella Jasso is that patient has a history of elevated ammonia second to liver cirrhosis. This is reportedly x3 days. ROS is limited due to patient altered mental status. Review of Systems   Unable to perform ROS: Mental status change        Physical Exam  Vitals signs and nursing note reviewed. Constitutional:       General: He is awake. He is not in acute distress. Appearance: He is normal weight. He is not ill-appearing or toxic-appearing. HENT:      Head: Normocephalic and atraumatic. Nose: Nose normal.      Mouth/Throat:      Mouth: Mucous membranes are moist.      Pharynx: Oropharynx is clear. Eyes:      Extraocular Movements: Extraocular movements intact. Pupils: Pupils are equal, round, and reactive to light. Neck:      Musculoskeletal: Normal range of motion and neck supple. Cardiovascular:      Rate and Rhythm: Normal rate and regular rhythm. Pulses: Normal pulses. Radial pulses are 2+ on the right side and 2+ on the left side. Heart sounds: Normal heart sounds. Pulmonary:      Effort: Pulmonary effort is normal.      Breath sounds: Normal breath sounds. Abdominal:      General: Bowel sounds are normal. There is distension. Tenderness: There is generalized abdominal tenderness. Comments: Dull to percussion. Musculoskeletal: Normal range of motion. Skin:     General: Skin is warm and dry. Capillary Refill: Capillary refill takes less than 2 seconds. Neurological:      General: No focal deficit present. Mental Status: He is alert. He is disoriented.       GCS: GCS eye Reflex to MG   Result Value Ref Range    Sodium 132 132 - 146 mmol/L    Potassium reflex Magnesium 4.2 3.5 - 5.0 mmol/L    Chloride 97 (L) 98 - 107 mmol/L    CO2 26 22 - 29 mmol/L    Anion Gap 9 7 - 16 mmol/L    Glucose 188 (H) 74 - 99 mg/dL    BUN 17 8 - 23 mg/dL    CREATININE 1.0 0.7 - 1.2 mg/dL    GFR Non-African American >60 >=60 mL/min/1.73    GFR African American >60     Calcium 9.2 8.6 - 10.2 mg/dL    Total Protein 6.4 6.4 - 8.3 g/dL    Albumin 3.1 (L) 3.5 - 5.2 g/dL    Total Bilirubin 1.8 (H) 0.0 - 1.2 mg/dL    Alkaline Phosphatase 269 (H) 40 - 129 U/L    ALT 32 0 - 40 U/L    AST 45 (H) 0 - 39 U/L   Lactic Acid, Plasma   Result Value Ref Range    Lactic Acid 2.9 (H) 0.5 - 2.2 mmol/L   Troponin   Result Value Ref Range    Troponin <0.01 0.00 - 0.03 ng/mL   Ammonia   Result Value Ref Range    Ammonia 71.4 (H) 16.0 - 60.0 umol/L   Urinalysis   Result Value Ref Range    Color, UA Yellow Straw/Yellow    Clarity, UA Clear Clear    Glucose, Ur Negative Negative mg/dL    Bilirubin Urine Negative Negative    Ketones, Urine Negative Negative mg/dL    Specific Gravity, UA 1.020 1.005 - 1.030    Blood, Urine Negative Negative    pH, UA 5.5 5.0 - 9.0    Protein, UA Negative Negative mg/dL    Urobilinogen, Urine 2.0 (A) <2.0 E.U./dL    Nitrite, Urine Negative Negative    Leukocyte Esterase, Urine Negative Negative   EKG 12 Lead   Result Value Ref Range    Ventricular Rate 0 BPM    Atrial Rate 0 BPM    QRS Duration 0 ms    Q-T Interval 0 ms    QTc Calculation (Bazett) 0 ms    R Axis 0 degrees    T Axis 0 degrees       RADIOLOGY:  CT HEAD WO CONTRAST   Final Result   No acute intracranial abnormality. Age-related loss of brain volume and   chronic periventricular ischemic changes. 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 XR CHEST PORTABLE   Final Result   Limited due to low lung volumes.   Interstitial prominence bilaterally could   suggest pulmonary vascular congestion or peribronchial inflammatory changes. No focal airspace opacity or pleural effusion. EKG: This EKG is signed and interpreted by me. Rate: 80  Rhythm: Sinus with PVCs  Interpretation: no acute changes  Comparison: no previous EKG available    ------------------------- NURSING NOTES AND VITALS REVIEWED ---------------------------  Date / Time Roomed:  4/12/2021  5:29 PM  ED Bed Assignment:  [id number]    The nursing notes within the ED encounter and vital signs as below have been reviewed. Patient Vitals for the past 24 hrs:   BP Temp Pulse Resp SpO2 Height Weight   04/12/21 1444 116/69 96.9 °F (36.1 °C) 89 14 97 % 6' 2\" (1.88 m) 222 lb (100.7 kg)       Oxygen Saturation Interpretation: Normal    ------------------------------------------ PROGRESS NOTES ------------------------------------------  Re-evaluation(s):  Patients symptoms show no change  Repeat physical examination is not changed    Counseling:  I have not spoken with the patient and discussed todays results, in addition to providing specific details for the plan of care and counseling regarding the diagnosis and prognosis.     --------------------------------- ADDITIONAL PROVIDER NOTES ---------------------------------  Consultations:  Spoke with Dr. Rafael Tinoco. Discussed case. They will admit the patient. This patient's ED course included: a personal history and physicial examination, re-evaluation prior to disposition and IV medications    This patient has remained hemodynamically stable during their ED course. Diagnosis:  1. Hepatic encephalopathy (HCC)    2. Other ascites    3. History of cirrhosis      Disposition:  Patient's disposition: Admit to telemetry  Patient's condition is stable. 4/12/21, 9:37 PM EDT.     This note is prepared by Jaxson Garcia MD -PGY- 2             Jaxson Garcia MD  Resident  04/12/21 9523

## 2021-04-12 NOTE — TELEPHONE ENCOUNTER
Pts wife calling to let you know that he is very confused. She states that he had periocentesis Wednesday and since then he has not been himself and hasn't been acting appropriate and is impulsive. She isn't sure what should be done. She stated that she cannot handle him. They are concerned about his levels   She said that the visiting nurse would be happy to draw the levels ammonia levels if necessary.

## 2021-04-12 NOTE — TELEPHONE ENCOUNTER
Velma notified. Attempted to notify Mukul Gunter and she did not answer. Velma is going to get in contact so that pt can be taken in.  She will call back if she needs anything else

## 2021-04-13 NOTE — CONSULTS
no bleeding during the procedure. The gastric side of EG junction was edematous and slightly irregular. Brush cytology taken - Negative for malignant cells. . No biopsy deemed safe Mild portal hypertensive gastropathy was found in the stomach. Cardiac Cath at [facility] with Dr Samson Le - Mild CAD. Mild aortic valve calcifications. Admission labs: Albumin 3.1 alk phos 269; ammonia 71.3; AST 45; bilirubin 1.8: R 9/7: 22.9 glucose 188; RDW 16.7; platelet 436; lymphs 12.6%; mono 16.7%; absolute 0.69. CT Head - No acute intracranial abnormality. Age-related loss of brain volume and chronic periventricular ischemic changes. 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 CXR - Limited due to low lung volumes. Interstitial prominence bilaterally could suggest pulmonary vascular congestion or peribronchial inflammatory changes. No focal airspace opacity or pleural effusion. Consultation for hepatic encephalopathy, pt in transplant list.  Currently reports he feels \"fine. I know I am in the hospital.\"  Patient denies abdominal pain, nausea, or vomiting. States he had a BM \"a little bit ago, brown. \"   Labs today: LA 2.3; .      Past Medical History:        Diagnosis Date    Liver problem      Past Surgical History:        Procedure Laterality Date    EYE SURGERY      HERNIA REPAIR       Current Medications:    Current Facility-Administered Medications: atorvastatin (LIPITOR) tablet 40 mg, 40 mg, Oral, Daily  furosemide (LASIX) tablet 20 mg, 20 mg, Oral, Nightly  furosemide (LASIX) tablet 40 mg, 40 mg, Oral, QAM  nadolol (CORGARD) tablet 20 mg, 20 mg, Oral, Nightly  pantoprazole (PROTONIX) tablet 40 mg, 40 mg, Oral, QAM AC  spironolactone (ALDACTONE) tablet 150 mg, 150 mg, Oral, Daily  rifaximin (XIFAXAN) tablet 550 mg, 550 mg, Oral, BID  sodium chloride flush 0.9 % injection 5-40 mL, 5-40 mL, Intravenous, 2 times per day  sodium chloride flush 0.9 % injection 5-40 mL, 5-40 mL, Intravenous, PRN  0.9 % sodium chloride infusion, 25 mL, Intravenous, PRN  promethazine (PHENERGAN) tablet 12.5 mg, 12.5 mg, Oral, Q6H PRN **OR** ondansetron (ZOFRAN) injection 4 mg, 4 mg, Intravenous, Q6H PRN  polyethylene glycol (GLYCOLAX) packet 17 g, 17 g, Oral, Daily PRN  acetaminophen (TYLENOL) tablet 650 mg, 650 mg, Oral, Q6H PRN **OR** acetaminophen (TYLENOL) suppository 650 mg, 650 mg, Rectal, Q6H PRN  lactulose (CHRONULAC) 10 GM/15ML solution 30 g, 30 g, Oral, 4 times per day  [START ON 2021] albumin human 25 % IV solution 75 g, 75 g, Intravenous, Once    Allergies:  Patient has no known allergies. Social History:    Tobacco:  Pt denies. Alcohol:  Pt denies. Illicit Drugs: Pt denies. Family History:   Family History   Problem Relation Age of Onset   [facility] Breast Cancer Mother     Heart Disease Mother     Emphysema Mother     Hemochromatosis Brother    Patient states his mother also had COPD, she was a smoker. Father  from MI. Brother with cirrhosis, hemochromatosis. States he has 1 daughter and 1 son living, \"I do not know if they are healthy or not. \"    REVIEW OF SYSTEMS:    Aside from what was mentioned in the PMH and HPI, essentially unremarkable, all others negative. PHYSICAL EXAM:      Vitals:    /76   Pulse 77   Temp 98 °F (36.7 °C) (Temporal)   Resp 18   Ht 6' 2\" (1.88 m)   Wt 225 lb 3.2 oz (102.2 kg)   SpO2 95%   BMI 28.91 kg/m²       CONSTITUTIONAL:  awake, somewhat confused, cooperative, sitting in chair, pale, fatigued appearing, and appears stated age  EYES:  pupils equal, round and reactive to light, sclera anicteric and conjunctiva pale  ENT:  normocephalic, oral pharynx with moist mucous membranes  NECK:  supple   LUNGS: Diminished to auscultation bilaterally.   CARDIOVASCULAR:  regular rate and rhythm, no murmur noted; 2+ pulses; no edema  ABDOMEN:  normal bowel sounds, large, tense, distended, + fluid wave with+ ascites, non-tender, no masses palpated, no hepatosplenomegally  MUSCULOSKELETAL:  full range of motion noted  motor strength is 5 out of 5 all extremities bilaterally  NEUROLOGIC:  Mental Status Exam:  Level of Alertness:   awake  Orientation:   Self, place -hospital  Motor Exam:  Motor exam is symmetrical 5 out of 5 all extremities bilaterally  SKIN: Pale skin color, warm, and dry    DATA:    CBC with Differential:    Lab Results   Component Value Date    WBC 3.9 04/13/2021    RBC 3.80 04/13/2021    HGB 12.4 04/13/2021    HCT 36.3 04/13/2021    PLT 76 04/13/2021    MCV 95.5 04/13/2021    MCH 32.6 04/13/2021    MCHC 34.2 04/13/2021    RDW 16.8 04/13/2021    LYMPHOPCT 10.0 04/13/2021    MONOPCT 16.0 04/13/2021    BASOPCT 1.0 04/13/2021    MONOSABS 0.62 04/13/2021    LYMPHSABS 0.39 04/13/2021    EOSABS 0.04 04/13/2021    BASOSABS 0.04 04/13/2021     CMP:    Lab Results   Component Value Date     04/13/2021    K 4.0 04/13/2021     04/13/2021    CO2 26 04/13/2021    BUN 18 04/13/2021    CREATININE 0.9 04/13/2021    GFRAA >60 04/13/2021    LABGLOM >60 04/13/2021    GLUCOSE 133 04/13/2021    PROT 5.3 04/13/2021    LABALBU 2.6 04/13/2021    CALCIUM 8.8 04/13/2021    BILITOT 2.0 04/13/2021    ALKPHOS 214 04/13/2021    AST 35 04/13/2021    ALT 27 04/13/2021     Hepatic Function Panel:    Lab Results   Component Value Date    ALKPHOS 214 04/13/2021    ALT 27 04/13/2021    AST 35 04/13/2021    PROT 5.3 04/13/2021    BILITOT 2.0 04/13/2021    LABALBU 2.6 04/13/2021     PT/INR:    Lab Results   Component Value Date    PROTIME 18.4 04/13/2021    INR 1.7 04/13/2021     PTT:    Lab Results   Component Value Date    APTT 45.0 04/13/2021   [APTT}  Last 3 Troponin:    Lab Results   Component Value Date    TROPONINI <0.01 04/12/2021       Ct Head Wo Contrast    Result Date: 4/12/2021  EXAMINATION: CT OF THE HEAD WITHOUT CONTRAST  4/12/2021 5:27 pm TECHNIQUE: CT of the head was performed without the administration of intravenous contrast. Dose modulation, iterative IMPRESSION:  · Hepatic encephalopathy   · Ascites  · Cirrhosis of liver secondary to [facility] -[facility] at [facility] [place], for possible transplant  · Elevated LFTs  · Anemia, normocytic  · Pancytopenia  · MELD 16.81    RECOMMENDATIONS:    · Lactulose and Xifaxan as ordered  · IR US paracentesis with fluid analysis and SPA as ordered  · Monitor CMP, CBC, INR, ammonia daily  · Continue nadolol  · Diurese per PCP  · Medical management per PCP  · Supportive care  · Continue to monitor    Note: This report was completed utilizing computer voice recognition software. Every effort has been made to ensure accuracy, however; inadvertent computerized transcription errors may be present. Thank you very much for your consultation. We will follow closely with you.     Discussed with Dr. Hollie Varma developed by Dr. Carmenza Patel [id number], FNP-BC 4/13/2021 12:31 PM for Dr. Abhay Guadalupe

## 2021-04-13 NOTE — PROGRESS NOTES
[facility]  Progress Note  Chief Complaint   Patient presents with    Altered Mental Status     x 3 days, hx of elevated amonia       Subjective:    Patient continues to be confused and disoriented. Oriented to self only. Review of system is negative. As per nurse, no acute events overnight. Patient has not had a bowel movement yet    Past medical, surgical, family and social history were reviewed, non-contributory, and unchanged unless otherwise stated. Objective:    BP (!) 148/84   Pulse 75   Temp 98.1 °F (36.7 °C) (Oral)   Resp 18   Ht 6' 2\" (1.88 m)   Wt 225 lb 3.2 oz (102.2 kg)   SpO2 96%   BMI 28.91 kg/m²   Signs reviewed  Heart:  RRR, no murmurs, gallops, or rubs. Lungs:  CTA bilaterally, no wheeze, rales or rhonchi  Abd: bowel sounds present, nontender, distended. Fluid wave.   extrem:  No clubbing, cyanosis, or edema.  Asterixis positive    CBC with Differential:    Lab Results   Component Value Date    WBC 5.5 04/12/2021    RBC 4.34 04/12/2021    HGB 14.3 04/12/2021    HCT 41.6 04/12/2021     04/12/2021    MCV 95.9 04/12/2021    MCH 32.9 04/12/2021    MCHC 34.4 04/12/2021    RDW 16.7 04/12/2021    LYMPHOPCT 12.6 04/12/2021    MONOPCT 16.7 04/12/2021    BASOPCT 0.5 04/12/2021    MONOSABS 0.91 04/12/2021    LYMPHSABS 0.69 04/12/2021    EOSABS 0.09 04/12/2021    BASOSABS 0.03 04/12/2021     CMP:    Lab Results   Component Value Date     04/12/2021    K 4.2 04/12/2021    CL 97 04/12/2021    CO2 26 04/12/2021    BUN 17 04/12/2021    CREATININE 1.0 04/12/2021    GFRAA >60 04/12/2021    LABGLOM >60 04/12/2021    GLUCOSE 188 04/12/2021    PROT 6.4 04/12/2021    LABALBU 3.1 04/12/2021    CALCIUM 9.2 04/12/2021    BILITOT 1.8 04/12/2021    ALKPHOS 269 04/12/2021    AST 45 04/12/2021    ALT 32 04/12/2021     PT/INR:  No results found for: PROTIME, INR  HgBA1c:    Lab Results   Component Value Date    LABA1C 5.5 07/15/2020      CT HEAD WO CONTRAST   Final Result   No acute intracranial abnormality. Age-related loss of brain volume and   chronic periventricular ischemic changes. 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 XR CHEST PORTABLE   Final Result   Limited due to low lung volumes. Interstitial prominence bilaterally could   suggest pulmonary vascular congestion or peribronchial inflammatory changes. No focal airspace opacity or pleural effusion. US ABDOMEN LIMITED Specify organ? LIVER    (Results Pending)       Assessment:    Active Hospital Problems    Diagnosis Date Noted    Hepatic encephalopathy ([facility] [address] [K72.90] 04/02/2021    Advanced cirrhosis of liver ([facility] [address] [K74.60] 04/02/2021    Type 2 diabetes mellitus with diabetic polyneuropathy, without long-term current use of insulin ([facility] [address] [E11.42] 04/02/2021       Plan:  Hepatic encephalopathy  End-stage liver disease, currently on transplant list.  Ricky Meza with GI in [facility] [place] [facility] [place]  Awaiting INR for calculating MELD score  -Consult GI here  -Continue with lactulose 30 mg 3 times daily. Start rifaximin  Patient was supposed to be on rifaximin but could not afford it financially.  -Continue Lasix 60 mg and spironolactone 150 mg  -Continue nadolol  -Recheck ammonia level   -Liver enzymes and bilirubin all baseline. Monitor CMP, INR.   -Check ultrasound for possible paracentesis. Hold Lovenox in anticipation for procedure.     Lactic acidosis  Probably due to poor oral intake/dehydration. Will give very slowly normal saline, 500 mL for 8 hours. Recheck lactic acid- improved from 2.9 to 2.3.     Diabetes mellitus  Last A1c 5.5%. Last time PCP decreased glipizide to once a day at home.  -Hold glipizide. Monitor sugars and will decide if insulin is even needed.        DVT ppx: PCD.   Anticoagulation in anticipation for any procedures such as paracentesis  GI ppx: Protonix  Code Status: Full               Electronically signed by Yimi Bundy MD on 4/13/2021 at 6:09 AM

## 2021-04-13 NOTE — H&P
.  CT head without acute changes. Patient was given lactulose and was admitted for further evaluation    ROS:   Review of Systems   Unable to perform ROS: Mental status change         Past Medical History:   Diagnosis Date    Liver problem          Past Surgical History:   Procedure Laterality Date    EYE SURGERY      HERNIA REPAIR         Medications Prior to Admission:    Prior to Admission medications    Medication Sig Start Date End Date Taking? Authorizing Provider   atorvastatin (LIPITOR) 40 MG tablet Take 1 tablet by mouth daily 4/10/21 7/9/21  Amari Ruiz MD   CONSTULOSE 10 GM/15ML solution 30mL by mouth 2-3 times daily as needed. 4/10/21   Amari Ruiz MD   furosemide (LASIX) 40 MG tablet  12/26/20   Historical Provider, MD   furosemide (LASIX) 20 MG tablet  2/26/21   Historical Provider, MD   spironolactone (ALDACTONE) 50 MG tablet Take 150 mg by mouth daily 2/26/21   Historical Provider, MD   omeprazole (PRILOSEC) 20 MG delayed release capsule Take 20 mg by mouth daily 2/26/21   Historical Provider, MD   ergocalciferol (ERGOCALCIFEROL) 1.25 MG (33967 UT) capsule Take 50,000 Units by mouth once a week 1/19/21   Historical Provider, MD   vitamin A 3 MG (72195 UT) capsule Take 10,000 Units by mouth daily 1/19/21   Historical Provider, MD   nadolol (CORGARD) 20 MG tablet Take 20 mg by mouth nightly 1/19/21   Historical Provider, MD   glipiZIDE (GLUCOTROL XL) 10 MG extended release tablet Take 1 tablet by mouth daily (with breakfast) 4/2/21   Amari Ruiz MD   atorvastatin (LIPITOR) 40 MG tablet Take 40 mg by mouth daily 1/20/21   Historical Provider, MD        Allergies:   Patient has no known allergies. Social History:    reports that he has never smoked. He has never used smokeless tobacco. He reports that he does not drink alcohol or use drugs.     Family History:   family history includes Breast Cancer in his mother; Emphysema in his mother; Heart Disease in his mother; Hemochromatosis in his brother. PHYSICAL EXAM:    Vitals:  /70   Pulse 80   Temp 98 °F (36.7 °C) (Oral)   Resp 16   Ht 6' 2\" (1.88 m)   Wt 222 lb (100.7 kg)   SpO2 96%   BMI 28.50 kg/m²     Physical Exam  Vitals signs reviewed. Constitutional:       General: He is not in acute distress. Appearance: He is ill-appearing. He is not toxic-appearing. Comments: Confused, disoriented. Cannot remember what happened, but aware of his situation regarding his liver   HENT:      Head: Normocephalic and atraumatic. Mouth/Throat:      Mouth: Mucous membranes are dry. Eyes:      Extraocular Movements: Extraocular movements intact. Pupils: Pupils are equal, round, and reactive to light. Neck:      Musculoskeletal: Normal range of motion and neck supple. No neck rigidity. Cardiovascular:      Rate and Rhythm: Normal rate and regular rhythm. Heart sounds: Normal heart sounds. Pulmonary:      Effort: Pulmonary effort is normal. No respiratory distress. Breath sounds: Normal breath sounds. No rhonchi. Abdominal:      General: There is distension. Palpations: Abdomen is soft. Tenderness: There is no abdominal tenderness. There is no guarding or rebound. Comments: Fluid wave sign   Musculoskeletal: Normal range of motion. Right lower leg: No edema. Left lower leg: No edema. Skin:     Capillary Refill: Capillary refill takes less than 2 seconds. Coloration: Skin is jaundiced (slightly jaundiced). Findings: No rash. Comments: Purpura noticed on arms   Neurological:      General: No focal deficit present. Cranial Nerves: No cranial nerve deficit. Sensory: No sensory deficit. Motor: No weakness. Comments: Oriented x1  Only to himself  Asterixis positive   Psychiatric:         Mood and Affect: Mood normal.         Speech: Speech is delayed (baseline).          LABS:  Recent Results (from the past 24 hour(s))   CBC PM   Result Value Ref Range    Color, UA Yellow Straw/Yellow    Clarity, UA Clear Clear    Glucose, Ur Negative Negative mg/dL    Bilirubin Urine Negative Negative    Ketones, Urine Negative Negative mg/dL    Specific Gravity, UA 1.020 1.005 - 1.030    Blood, Urine Negative Negative    pH, UA 5.5 5.0 - 9.0    Protein, UA Negative Negative mg/dL    Urobilinogen, Urine 2.0 (A) <2.0 E.U./dL    Nitrite, Urine Negative Negative    Leukocyte Esterase, Urine Negative Negative   EKG 12 Lead    Collection Time: 04/12/21  7:39 PM   Result Value Ref Range    Ventricular Rate 80 BPM    Atrial Rate 80 BPM    P-R Interval 158 ms    QRS Duration 98 ms    Q-T Interval 376 ms    QTc Calculation (Bazett) 433 ms    P Axis 11 degrees    R Axis -63 degrees    T Axis 53 degrees       CT HEAD WO CONTRAST   Final Result   No acute intracranial abnormality. Age-related loss of brain volume and   chronic periventricular ischemic changes. 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 XR CHEST PORTABLE   Final Result   Limited due to low lung volumes. Interstitial prominence bilaterally could   suggest pulmonary vascular congestion or peribronchial inflammatory changes. No focal airspace opacity or pleural effusion. ASSESSMENT/PLAN:      Active Hospital Problems    Diagnosis Date Noted    Hepatic encephalopathy ([facility] [address] [K72.90] 04/02/2021    Advanced cirrhosis of liver ([address] [K74.60] 04/02/2021    Type 2 diabetes mellitus with diabetic polyneuropathy, without long-term current use of insulin ([address] [E11.42] 04/02/2021       Hepatic encephalopathy  End-stage liver disease, currently on transplant list.  Follows with GI in [place]  Awaiting INR for calculating MELD score  -Consult GI here  -Continue with lactulose 30 mg 3 times daily.   Start rifaximin  Patient was supposed to be on rifaximin but could not afford it financially.  -Continue Lasix 60 mg and spironolactone 150 mg  -Continue nadolol -Recheck ammonia level   -Liver enzymes and bilirubin all baseline. Monitor CMP, INR.   -Check ultrasound for possible paracentesis. Hold Lovenox anticipation for procedure. Lactic acidosis  Probably due to poor oral intake/dehydration. Will give very slowly normal saline, 500 mL for 8 hours. Recheck lactic acid    Diabetes mellitus  Last A1c 5.5%. Last time PCP decreased glipizide to once a day at home.  -Hold glipizide. Monitor sugars and will decide if insulin is even needed. DVT ppx: PCD.   Anticoagulation in anticipation for any procedures such as paracentesis  GI ppx: Protonix  Code Status: Full        Case discussed with attending on call Dr. Steven Salvador MD  Family Medicine Resident Physician   4/13/2021

## 2021-04-13 NOTE — HOME CARE
Patient current with [facility] for SN,PT/OT/HHA. Will need KAYLA orders if appropriate at discharge. Aarti Hernandez LPN  [facility].

## 2021-04-13 NOTE — PROGRESS NOTES
IRFLOWSHEET    Date: 4/13/2021    Time: 2:47 PM     Exam: Ultrasound Guided Paracentesis     Radiologist Performing Procedure: Dr Chris Fitzgerald    Nurse Reporting/Phone: [id number]     Nurse Receiving: Lauren Robledo    Permit signed:      Yes    ID Band Checklist: Yes    Allergies: Patient has no known allergies. TIME OUT: 1450    PROCEDURE START TIME: 1445    Puncture Site: RLQ    Puncture Time: 1451    Catheters: 5fr    LABS:   Lab Results   Component Value Date    INR 1.7 04/13/2021    PROTIME 18.4 (H) 04/13/2021           Lab Results   Component Value Date    CREATININE 0.9 04/13/2021    BUN 18 04/13/2021          Lab Results   Component Value Date    HGB 12.4 (L) 04/13/2021    HCT 36.3 (L) 04/13/2021    PLT 76 (L) 04/13/2021         PROCEDURE END TIME: 1530    Total Contrast: n/a    Fluoroscopy Time: n/a    Complications:  None    Comments: Patient arrival from room  for paracentesis. Vitals taken, , and consent signed. Dr Chris Fitzgerald.  in to speak with the patient about the procedure, all questions answered. Abdomen scanned, prepped and centesis catheter inserted RLQ with ultrasound guidance by Dr Chris Fitzgerald  @.1451 Patient tolerated well. 4000 ml drained of ramesh colored ascitic fluid. Centesis catheter removed @ 1530  . Puncture site cleansed and dry dressing applied. No bleeding, swelling or complications noted. Report called to floor nurse.

## 2021-04-13 NOTE — PROGRESS NOTES
PROGRESS NOTE    Patient Presents with/Seen in Consultation For      *hepatic encephalopathy, pt in transplant list  CHIEF COMPLAINT:  AMS    Subjective:     Patient states he feels good. Patient more oriented today. Denies abdominal pain, nausea, or vomiting. Asking when he can go home. States he had a brown stool today x1. Review of Systems  Aside from what was mentioned in the PMH and HPI, essentially unremarkable, all others negative. Objective:     /70   Pulse 66   Temp 97.5 °F (36.4 °C) (Oral)   Resp 16   Ht 6' 2\" (1.88 m)   Wt 228 lb 3.2 oz (103.5 kg)   SpO2 95%   BMI 29.30 kg/m²     General appearance: alert, awake, laying in bed in no apparent distress, pale, and cooperative  Eyes: conjunctiva pale, sclera anicteric. PERRL. Lungs: Diminished to auscultation bilaterally  Heart: regular rate and rhythm, no murmur, 2+ pulses; no edema  Abdomen: softly distended, non-tender; bowel sounds normal; no masses,  no organomegaly  Extremities: extremities without edema  Pulses: 2+ and symmetric  Skin: Skin color pale, texture, turgor normal.   Neurologic: Alert, oriented to person, time, place. MARYJO strong.     atorvastatin (LIPITOR) tablet 40 mg, Daily  furosemide (LASIX) tablet 20 mg, Nightly  furosemide (LASIX) tablet 40 mg, QAM  nadolol (CORGARD) tablet 20 mg, Nightly  pantoprazole (PROTONIX) tablet 40 mg, QAM AC  spironolactone (ALDACTONE) tablet 150 mg, Daily  rifaximin (XIFAXAN) tablet 550 mg, BID  sodium chloride flush 0.9 % injection 5-40 mL, 2 times per day  sodium chloride flush 0.9 % injection 5-40 mL, PRN  0.9 % sodium chloride infusion, PRN  promethazine (PHENERGAN) tablet 12.5 mg, Q6H PRN    Or  ondansetron (ZOFRAN) injection 4 mg, Q6H PRN  polyethylene glycol (GLYCOLAX) packet 17 g, Daily PRN  acetaminophen (TYLENOL) tablet 650 mg, Q6H PRN    Or  acetaminophen (TYLENOL) suppository 650 mg, Q6H PRN  lactulose (CHRONULAC) 10 GM/15ML solution 30 g, 4 times per day         Data Review  CBC:   Lab Results   Component Value Date    WBC 3.8 04/14/2021    RBC 3.32 04/14/2021    HGB 10.7 04/14/2021    HCT 31.7 04/14/2021    MCV 95.5 04/14/2021    MCH 32.2 04/14/2021    MCHC 33.8 04/14/2021    RDW 16.5 04/14/2021    PLT 71 04/14/2021    MPV 10.2 04/14/2021     CMP:    Lab Results   Component Value Date     04/14/2021    K 3.7 04/14/2021     04/14/2021    CO2 25 04/14/2021    BUN 16 04/14/2021    CREATININE 0.9 04/14/2021    GFRAA >60 04/14/2021    LABGLOM >60 04/14/2021    GLUCOSE 142 04/14/2021    PROT 4.9 04/14/2021    LABALBU 2.9 04/14/2021    CALCIUM 8.7 04/14/2021    BILITOT 1.7 04/14/2021    ALKPHOS 172 04/14/2021    AST 28 04/14/2021    ALT 21 04/14/2021     Hepatic Function Panel:    Lab Results   Component Value Date    ALKPHOS 172 04/14/2021    ALT 21 04/14/2021    AST 28 04/14/2021    PROT 4.9 04/14/2021    BILITOT 1.7 04/14/2021    BILIDIR 0.4 04/14/2021    IBILI 1.3 04/14/2021    LABALBU 2.9 04/14/2021       PT/INR:    Lab Results   Component Value Date    PROTIME 20.7 04/14/2021    INR 1.9 04/14/2021       Assessment:     Active Problems:  ? Hepatic encephalopathy -resolved -patient A/O x4  ? Ascites -resolved post paracentesis  ? Cirrhosis of liver secondary to [facility] -follows at [facility], for possible transplant  ? Elevated LFTs -improving  ? Anemia, normocytic -stable  ? Pancytopenia  ? MELD 15.62  ? S/P IR US Paracentesis 4/13/21 for 4 Liters fluid    Plan:     ? Continue Lactulose and Xifaxan as ordered  ? Monitor CMP, CBC, INR, ammonia daily  ? Continue nadolol  ? Diurese per PCP  ? Medical management per PCP  ? Supportive care  ? Discharge per PCP, ok from GI POV with follow up at [facility] with his GI/liver team.   ? Continue current plan of care until discharge    Note: This report was completed utilizing computer voice recognition software. Every effort has been made to ensure accuracy, however; inadvertent computerized transcription errors may be present.      Discussed with Dr. Ba Prince per Dr. Milind Roldan [id number], FNP-BC 4/14/2021 1:03 PM For Dr. Opal Weems

## 2021-04-14 NOTE — PROGRESS NOTES
[facility]  [facility]    Chief complaint :  Chief Complaint   Patient presents with    Altered Mental Status     x 3 days, hx of elevated amonia       Subjective:    No overnight problems. Patient describes feeling like he has to have a bowel movement this morning. He is alert and oriented x2 (not oriented to time). Tolerating diet. As per nurse, had 1 small bowel movement overnight. Past medical, surgical, family and social history were reviewed, non-contributory, and unchanged unless otherwise stated. Review of Systems   Unable to perform ROS: Mental status change   Constitutional: Negative for chills, fatigue and fever. HENT: Negative for congestion, rhinorrhea and sore throat. Respiratory: Negative for cough, chest tightness and shortness of breath. Cardiovascular: Negative for chest pain and palpitations. Gastrointestinal: Negative for abdominal pain, constipation, diarrhea, nausea and vomiting. Genitourinary: Negative for dysuria and frequency. Neurological: Negative for dizziness and light-headedness. All other systems reviewed and are negative. Objective:  /61   Pulse 65   Temp 98.2 °F (36.8 °C) (Oral)   Resp 18   Ht 6' 2\" (1.88 m)   Wt 228 lb 3.2 oz (103.5 kg)   SpO2 95%   BMI 29.30 kg/m²     Physical Exam  Constitutional:       General: He is not in acute distress. Appearance: Normal appearance. HENT:      Head: Normocephalic and atraumatic. Mouth/Throat:      Mouth: Mucous membranes are moist.      Pharynx: Oropharynx is clear. Eyes:      Extraocular Movements: Extraocular movements intact. Conjunctiva/sclera: Conjunctivae normal.      Comments: Sunken eyes   Neck:      Musculoskeletal: Normal range of motion. Cardiovascular:      Rate and Rhythm: Normal rate and regular rhythm. Pulses: Normal pulses. Heart sounds: Normal heart sounds. No murmur.    Pulmonary:      Effort: Pulmonary effort is normal.      Breath sounds: Normal breath sounds. No wheezing. Abdominal:      General: Bowel sounds are normal. There is no distension. Palpations: Abdomen is soft. Tenderness: There is no abdominal tenderness. Musculoskeletal:         General: No swelling. Skin:     General: Skin is warm and dry. Coloration: Skin is jaundiced (mild). Neurological:      General: No focal deficit present. Mental Status: He is alert. Cranial Nerves: No cranial nerve deficit.       Comments: Oriented x2   Psychiatric:         Attention and Perception: Attention normal.         Mood and Affect: Mood normal.         Labs:  Recent Results (from the past 24 hour(s))   POCT Glucose    Collection Time: 04/13/21 10:52 AM   Result Value Ref Range    Meter Glucose 213 (H) 74 - 99 mg/dL   Albumin, Fluid    Collection Time: 04/13/21  2:56 PM   Result Value Ref Range    Albumin, Fluid 0.3 Not Established g/dL    Fluid Type Ascites    Amylase, Body Fluid    Collection Time: 04/13/21  2:56 PM   Result Value Ref Range    Amylase, Fluid 18 Not Established U/L   Body Fluid CEA    Collection Time: 04/13/21  2:56 PM   Result Value Ref Range    CEA Fluid 0.8 ng/mL   Body Fluid Cell Count with Differential    Collection Time: 04/13/21  2:56 PM   Result Value Ref Range    Cell Count Fluid Type Ascites     Color, Fluid Yellow     Appearance, Fluid Clear     Nucl Cell, Fluid 97 /uL    RBC, Fluid <2,000 /uL    Neutrophil Count, Fluid 1 %    Monocyte Count, Fluid 99 %   Protein, Body Fluid    Collection Time: 04/13/21  2:56 PM   Result Value Ref Range    Protein, Fluid 0.7 Not Established g/dL   POCT Glucose    Collection Time: 04/13/21  4:12 PM   Result Value Ref Range    Meter Glucose 159 (H) 74 - 99 mg/dL   POCT Glucose    Collection Time: 04/13/21  8:00 PM   Result Value Ref Range    Meter Glucose 137 (H) 74 - 99 mg/dL   Comprehensive Metabolic Panel w/ Reflex to MG    Collection Time: 04/14/21  2:55 AM   Result Value Ref Range    Sodium 134 132 - 146 mmol/L    Potassium reflex Magnesium 3.7 3.5 - 5.0 mmol/L    Chloride 102 98 - 107 mmol/L    CO2 25 22 - 29 mmol/L    Anion Gap 7 7 - 16 mmol/L    Glucose 142 (H) 74 - 99 mg/dL    BUN 16 8 - 23 mg/dL    CREATININE 0.9 0.7 - 1.2 mg/dL    GFR Non-African American >60 >=60 mL/min/1.73    GFR African American >60     Calcium 8.7 8.6 - 10.2 mg/dL    Total Protein 4.9 (L) 6.4 - 8.3 g/dL    Albumin 2.9 (L) 3.5 - 5.2 g/dL    Total Bilirubin 1.7 (H) 0.0 - 1.2 mg/dL    Alkaline Phosphatase 172 (H) 40 - 129 U/L    ALT 21 0 - 40 U/L    AST 28 0 - 39 U/L   CBC auto differential    Collection Time: 04/14/21  2:55 AM   Result Value Ref Range    WBC 3.8 (L) 4.5 - 11.5 E9/L    RBC 3.32 (L) 3.80 - 5.80 E12/L    Hemoglobin 10.7 (L) 12.5 - 16.5 g/dL    Hematocrit 31.7 (L) 37.0 - 54.0 %    MCV 95.5 80.0 - 99.9 fL    MCH 32.2 26.0 - 35.0 pg    MCHC 33.8 32.0 - 34.5 %    RDW 16.5 (H) 11.5 - 15.0 fL    Platelets 71 (L) 984 - 450 E9/L    MPV 10.2 7.0 - 12.0 fL    Neutrophils % 62.0 43.0 - 80.0 %    Immature Granulocytes % 0.3 0.0 - 5.0 %    Lymphocytes % 15.7 (L) 20.0 - 42.0 %    Monocytes % 18.9 (H) 2.0 - 12.0 %    Eosinophils % 2.6 0.0 - 6.0 %    Basophils % 0.5 0.0 - 2.0 %    Neutrophils Absolute 2.36 1.80 - 7.30 E9/L    Immature Granulocytes # 0.01 E9/L    Lymphocytes Absolute 0.60 (L) 1.50 - 4.00 E9/L    Monocytes Absolute 0.72 0.10 - 0.95 E9/L    Eosinophils Absolute 0.10 0.05 - 0.50 E9/L    Basophils Absolute 0.02 0.00 - 0.20 E9/L    Anisocytosis 1+    APTT    Collection Time: 04/14/21  2:55 AM   Result Value Ref Range    aPTT 45.6 (H) 24.5 - 35.1 sec   Hepatic Function Panel    Collection Time: 04/14/21  2:55 AM   Result Value Ref Range    Bilirubin, Direct 0.4 (H) 0.0 - 0.3 mg/dL    Bilirubin, Indirect 1.3 (H) 0.0 - 1.0 mg/dL   Protime-INR    Collection Time: 04/14/21  2:55 AM   Result Value Ref Range    Protime 20.7 (H) 9.3 - 12.4 sec    INR 1.9    Ammonia    Collection Time: 04/14/21  2:55 AM Result Value Ref Range    Ammonia 75.8 (H) 16.0 - 60.0 umol/L   Platelet Confirmation    Collection Time: 04/14/21  2:55 AM   Result Value Ref Range    Platelet Confirmation CONFIRMED    POCT Glucose    Collection Time: 04/14/21  5:17 AM   Result Value Ref Range    Meter Glucose 119 (H) 74 - 99 mg/dL       Radiology and other tests reviewed:  US GUIDED PARACENTESIS   Final Result   Successful ultrasound guided paracentesis. US ABDOMEN LIMITED Specify organ? LIVER   Final Result   Large abdominal ascites. Splenomegaly. CT HEAD WO CONTRAST   Final Result   No acute intracranial abnormality. Age-related loss of brain volume and   chronic periventricular ischemic changes. 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 XR CHEST PORTABLE   Final Result   Limited due to low lung volumes. Interstitial prominence bilaterally could   suggest pulmonary vascular congestion or peribronchial inflammatory changes. No focal airspace opacity or pleural effusion.              Assessment:  Active Hospital Problems    Diagnosis Date Noted    Hepatic encephalopathy ([facility] [K72.90] 04/02/2021    Advanced cirrhosis of liver ([facility] [K74.60] 04/02/2021    Type 2 diabetes mellitus with diabetic polyneuropathy, without long-term current use of insulin ([facility] [address] [E11.42] 04/02/2021       Plan:  Hepatic encephalopathy  End-stage liver disease, currently on transplant list.  Follows with GI in [place]  Awaiting INR for calculating MELD score  -GI following  -Continue with lactulose 30 mg 4 times daily.  Start rifaximin  Patient was supposed to be on rifaximin but could not afford it financially.  -Continue Lasix 60 mg and spironolactone 150 mg  -Continue nadolol  -Liver enzymes and bilirubin all baseline.  Monitor CMP, INR.   -IR paracentesis pull 4L fluid     Lactic acidosis, resolved  Probably due to poor oral intake/dehydration  Recheck lactic acid- improved from 2.9 to 2.3 to 1.6    Diabetes mellitus  Last A1c 5.5%.  Last time PCP decreased glipizide to once a day at home.  -Hold glipizide.  Monitor sugars and will decide if insulin is even needed.       [facility] Medicine Resident PGY-1  04/14/21   8:37 AM

## 2021-04-14 NOTE — PROGRESS NOTES
inadvertent computerized transcription errors may be present.      Discussed with Dr. Surendra Parada per Dr. Pita Bae [id number], FNP-BC 4/15/2021 6:12 AM For Dr. Erik Johnson

## 2021-04-14 NOTE — CARE COORDINATION
Social Work/Discharge Planning:  Met with patient and completed initial assessment. Explained Social Work role and discussed transition of care/discharge planning. Patient lives with his wife in a one story house. Patient was not able to indicate medical equipment and informed him that this worker will contact his wife. Called patient wife Jeannie Vaca and confirmed discharge plan. Jeannie Vaca states they live with their daughter, son-in-law, three grand-children and 6year old great grand-daughter in a one story house with three steps. PTA patient uses a walker. He has a snf history with [facility]. Patient is active with [facility] and will need a resume home care order. Patient PCP Dr. Markus Bettencourt and pharmacy is [facility]. Jeannie Vaca states plan is home at discharge with [facility]. Will continue to follow and assist with discharge planning.   Electronically signed by EDWARD Ramires on 4/14/2021 at 2:56 PM

## 2021-04-14 NOTE — PROGRESS NOTES
4/14/2021  3:15 PM      Comprehensive Nutrition Assessment    Type and Reason for Visit:  Initial, Positive Nutrition Screen    Nutrition Recommendations/Plan: Continue current diet, as tolerated    Nutrition Assessment:  Pt w/hx. cirrhosis admit 2/2 AMS w/hyperammonemia. Pt has been having routine paracentesis wkly. Pt follows at [facility] for poss liver transplant. More alert now and tolerating PO. Estimated Daily Nutrient Needs:  Energy (kcal):  ; Weight Used for Energy Requirements:  Admission     Protein (g):   (1-1.2 g/kg); Weight Used for Protein Requirements:  Ideal        Fluid (ml/day):   or as tej; Method Used for Fluid Requirements:  1 ml/kcal      Nutrition Related Findings:  AMS improving, ammonia 75.8 (on lactulose), LFT elevated, 4/13 paracentesis w/4L off, +1 edema, taut abd +BS      Wounds:  Venous Stasis(reddened buttocks)       Current Nutrition Therapies:    DIET CARB CONTROL; Anthropometric Measures:  · Height: 6' 2\" (188 cm)  · Current Body Weight: 228 lb (103.4 kg)(4/14)   · Admission Body Weight: 225 lb (102.1 kg)(4/13)    · Usual Body Weight: 256 lb (116.1 kg)(6 mo at [facility] -pt with fluctuating fluid status 2/2 ascites/cirrhosis and loss likely relative)     · Ideal Body Weight: 190 lbs; % Ideal Body Weight 120 %   · BMI: 29.3  · BMI Categories: Overweight (BMI 25.0-29. 9)       Nutrition Diagnosis:   No nutrition diagnosis at this time     Nutrition Interventions:   Food and/or Nutrient Delivery:  Continue Current Diet  Nutrition Education/Counseling:  No recommendation at this time   Coordination of Nutrition Care:  Continue to monitor while inpatient    Goals:  PO remain 75% or better at meals       Nutrition Monitoring and Evaluation:   Behavioral-Environmental Outcomes:  None Identified   Food/Nutrient Intake Outcomes:  Food and Nutrient Intake  Physical Signs/Symptoms Outcomes:  GI Status, Biochemical Data, Fluid Status or Edema, Nutrition Focused Physical Findings,

## 2021-04-14 NOTE — PROGRESS NOTES
Effort: Pulmonary effort is normal.      Breath sounds: Normal breath sounds. No wheezing. Abdominal:      General: Bowel sounds are normal. There is distension. Palpations: Abdomen is soft. Tenderness: There is no abdominal tenderness. Musculoskeletal:         General: No swelling. Skin:     General: Skin is warm and dry. Coloration: Skin is jaundiced (mild). Neurological:      General: No focal deficit present. Mental Status: He is alert and oriented to person, place, and time. Cranial Nerves: No cranial nerve deficit.       Comments: Oriented x2   Psychiatric:         Attention and Perception: Attention normal.         Mood and Affect: Mood normal.         Labs:  Recent Results (from the past 24 hour(s))   POCT Glucose    Collection Time: 04/14/21 11:39 AM   Result Value Ref Range    Meter Glucose 169 (H) 74 - 99 mg/dL   POCT Glucose    Collection Time: 04/14/21  3:55 PM   Result Value Ref Range    Meter Glucose 133 (H) 74 - 99 mg/dL   POCT Glucose    Collection Time: 04/14/21  8:51 PM   Result Value Ref Range    Meter Glucose 142 (H) 74 - 99 mg/dL   Comprehensive Metabolic Panel w/ Reflex to MG    Collection Time: 04/15/21  3:27 AM   Result Value Ref Range    Sodium 134 132 - 146 mmol/L    Potassium reflex Magnesium 3.7 3.5 - 5.0 mmol/L    Chloride 103 98 - 107 mmol/L    CO2 22 22 - 29 mmol/L    Anion Gap 9 7 - 16 mmol/L    Glucose 172 (H) 74 - 99 mg/dL    BUN 15 8 - 23 mg/dL    CREATININE 0.8 0.7 - 1.2 mg/dL    GFR Non-African American >60 >=60 mL/min/1.73    GFR African American >60     Calcium 8.5 (L) 8.6 - 10.2 mg/dL    Total Protein 5.0 (L) 6.4 - 8.3 g/dL    Albumin 2.8 (L) 3.5 - 5.2 g/dL    Total Bilirubin 1.7 (H) 0.0 - 1.2 mg/dL    Alkaline Phosphatase 197 (H) 40 - 129 U/L    ALT 21 0 - 40 U/L    AST 30 0 - 39 U/L   CBC auto differential    Collection Time: 04/15/21  3:27 AM   Result Value Ref Range    WBC 4.4 (L) 4.5 - 11.5 E9/L    RBC 3.53 (L) 3.80 - 5.80 E12/L Hemoglobin 11.4 (L) 12.5 - 16.5 g/dL    Hematocrit 33.9 (L) 37.0 - 54.0 %    MCV 96.0 80.0 - 99.9 fL    MCH 32.3 26.0 - 35.0 pg    MCHC 33.6 32.0 - 34.5 %    RDW 16.7 (H) 11.5 - 15.0 fL    Platelets 82 (L) 542 - 450 E9/L    MPV 10.7 7.0 - 12.0 fL    Neutrophils % 62.8 43.0 - 80.0 %    Immature Granulocytes % 0.5 0.0 - 5.0 %    Lymphocytes % 16.2 (L) 20.0 - 42.0 %    Monocytes % 16.2 (H) 2.0 - 12.0 %    Eosinophils % 3.6 0.0 - 6.0 %    Basophils % 0.7 0.0 - 2.0 %    Neutrophils Absolute 2.79 1.80 - 7.30 E9/L    Immature Granulocytes # 0.02 E9/L    Lymphocytes Absolute 0.72 (L) 1.50 - 4.00 E9/L    Monocytes Absolute 0.72 0.10 - 0.95 E9/L    Eosinophils Absolute 0.16 0.05 - 0.50 E9/L    Basophils Absolute 0.03 0.00 - 0.20 E9/L   APTT    Collection Time: 04/15/21  3:27 AM   Result Value Ref Range    aPTT 42.2 (H) 24.5 - 35.1 sec   Hepatic Function Panel    Collection Time: 04/15/21  3:27 AM   Result Value Ref Range    Bilirubin, Direct 0.4 (H) 0.0 - 0.3 mg/dL    Bilirubin, Indirect 1.3 (H) 0.0 - 1.0 mg/dL   Protime-INR    Collection Time: 04/15/21  3:27 AM   Result Value Ref Range    Protime 20.0 (H) 9.3 - 12.4 sec    INR 1.9    Ammonia    Collection Time: 04/15/21  3:27 AM   Result Value Ref Range    Ammonia 80.9 (H) 16.0 - 60.0 umol/L   Platelet Confirmation    Collection Time: 04/15/21  3:27 AM   Result Value Ref Range    Platelet Confirmation CONFIRMED        Radiology and other tests reviewed:  US GUIDED PARACENTESIS   Final Result   Successful ultrasound guided paracentesis. US ABDOMEN LIMITED Specify organ? LIVER   Final Result   Large abdominal ascites. Splenomegaly. CT HEAD WO CONTRAST   Final Result   No acute intracranial abnormality. Age-related loss of brain volume and   chronic periventricular ischemic changes. 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 XR CHEST PORTABLE   Final Result   Limited due to low lung volumes.   Interstitial prominence bilaterally could   suggest pulmonary vascular congestion or peribronchial inflammatory changes. No focal airspace opacity or pleural effusion. Assessment:  Active Hospital Problems    Diagnosis Date Noted    Hepatic encephalopathy ([facility] [address] [K72.90] 04/02/2021    Advanced cirrhosis of liver ([facility] [address] [K74.60] 04/02/2021    Type 2 diabetes mellitus with diabetic polyneuropathy, without long-term current use of insulin ([facility] [address] [E11.42] 04/02/2021       Plan:  Hepatic encephalopathy  End-stage liver disease, currently on transplant list.  Follows with GI in [facility] [place]  -Ammonia 80.9  -GI following - ok to be d/janneth from their standpoint  -Continue with lactulose 30 mg 4 times daily.  Start rifaximin  Patient was supposed to be on rifaximin but could not afford it financially.  -Continue Lasix 60 mg and spironolactone 150 mg  -Continue nadolol  -Liver enzymes and bilirubin all baseline.  Monitor CMP, INR.   -IR paracentesis pulled 4L fluid on 4/13     Lactic acidosis, resolved  Probably due to poor oral intake/dehydration  Recheck lactic acid- improved from 2.9 to 2.3 to 1.6     Diabetes mellitus  Last A1c 5.5%.  Last time PCP decreased glipizide to once a day at home.  -Hold glipizide.  Monitor sugars and will decide if insulin is even needed.       [facility] [address] Resident PGY-1  04/15/21   6:45 AM

## 2021-04-14 NOTE — PROGRESS NOTES
Message sent to family [facility] resident regarding ok for discharge from GI pov,  No discharge today per [facility]

## 2021-04-15 PROBLEM — K76.82 HEPATIC ENCEPHALOPATHY: Status: RESOLVED | Noted: 2021-01-01 | Resolved: 2021-01-01

## 2021-04-15 NOTE — TELEPHONE ENCOUNTER
Wife calling he is admitted at [facility]. His ammonia level is high at 80. They are talking about sending him home. She feels that number is to high. Do you have any recommendations ?

## 2021-04-16 NOTE — TELEPHONE ENCOUNTER
[address]   He is calling to report that they will be seeing him for PT 2 times a week for 3 weeks.

## 2021-04-16 NOTE — DISCHARGE SUMMARY
Physician Discharge Summary  [facility]     Patient ID:  Chelsea Iglesias  [id number]  69 y.o.  1950    Admit date: 4/12/2021    Discharge date and time:  4/15/2021  7:01 PM    Admitting Physician: Brett Arias MD     Admission Diagnoses:   Hepatic encephalopathy [facility]) [K72.90]    Discharge Diagnoses: Active Problems:    Type 2 diabetes mellitus with diabetic polyneuropathy, without long-term current use of insulin (HCC)    Advanced cirrhosis of liver (HCC)    Other ascites  Resolved Problems:    Hepatic encephalopathy (HCC)      Consults: GI    Procedures: None    Hospital Course: Chelsea Iglesias is a 69 y.o. male who presented for evaluation of altered mental status. Patient was found to have hepatic encephalopathy due to cirrhosis from [place]. Patient had an elevated ammonia and GI was consulted and lactic acidosis which resolved with fluids. Patient was placed on lactulose. He was slowly titrated up in order to produce a bowel movement and ammonia was trended. Patient slowly progressed and had multiple bowel movements resulting in decreased ammonia and improve mental status. He had also been receiving more frequent paracentesis and received 1 while hospitalized. Interventional radiology was able to drain 4 L during this time. He had an otherwise uneventful course and progressed well. Pain was controlled. He was tolerating a regular diet with no nausea or vomiting, was ambulating well, and was in a suitable condition for discharge to home instructed to [facility].     Lab Results   Component Value Date    WBC 4.4 04/15/2021    HGB 11.4 04/15/2021    PLT 82 04/15/2021     04/15/2021     04/15/2021    K 3.7 04/15/2021    BUN 15 04/15/2021    CREATININE 0.8 04/15/2021    GLUCOSE 172 04/15/2021    LABGLOM >60 04/15/2021    PROTIME 20.0 04/15/2021    PROTIME 13.7 03/17/2021    INR 1.9 04/15/2021    LABALBU 2.8 04/15/2021    PROT 5.0 04/15/2021    CALCIUM 8.5 04/15/2021    BILITOT 1.7 04/15/2021    BILIDIR 0.4 04/15/2021    ALKPHOS 197 04/15/2021    AST 30 04/15/2021    ALT 21 04/15/2021       Discharge Exam:   VITALS: /60   Pulse 58   Temp 98.1 °F (36.7 °C) (Oral)   Resp 16   Ht 6' 2\" (1.88 m)   Wt 213 lb 11.2 oz (96.9 kg)   SpO2 97%   BMI 27.44 kg/m²     Physical Exam  Constitutional:       General: He is not in acute distress. Appearance: Normal appearance. HENT:      Head: Normocephalic and atraumatic. Mouth/Throat:      Mouth: Mucous membranes are moist.      Pharynx: Oropharynx is clear. Eyes:      Extraocular Movements: Extraocular movements intact. Conjunctiva/sclera: Conjunctivae normal.      Comments: Sunken eyes   Neck:      Musculoskeletal: Normal range of motion. Cardiovascular:      Rate and Rhythm: Normal rate and regular rhythm. Pulses: Normal pulses. Heart sounds: Normal heart sounds. No murmur. Pulmonary:      Effort: Pulmonary effort is normal.      Breath sounds: Normal breath sounds. No wheezing. Abdominal:      General: Bowel sounds are normal. There is distension. Palpations: Abdomen is soft. Tenderness: There is no abdominal tenderness. Musculoskeletal:         General: No swelling. Skin:     General: Skin is warm and dry. Coloration: Skin is jaundiced (mild). Neurological:      General: No focal deficit present. Mental Status: He is alert and oriented to person, place, and time. Cranial Nerves: No cranial nerve deficit. Psychiatric:         Attention and Perception: Attention normal.         Mood and Affect: Mood normal.        Disposition: home  Condition: poor    Patient Instructions:   Discharge Medication List as of 4/15/2021  6:33 PM           Details   CONSTULOSE 10 GM/15ML solution 30mL by mouth 2-3 times daily as needed. , Disp-3 Bottle, R-2, DAWDispense 946 mL bottlesNormal      rifaximin (XIFAXAN) 550 MG tablet Take 1 tablet by mouth 2 times daily, Disp-60 tablet, R-0Normal              Details   atorvastatin (LIPITOR) 40 MG tablet Take 1 tablet by mouth daily, Disp-30 tablet, R-2Normal      !! furosemide (LASIX) 40 MG tablet Historical Med      !! furosemide (LASIX) 20 MG tablet Historical Med      spironolactone (ALDACTONE) 50 MG tablet Take 150 mg by mouth dailyHistorical Med      omeprazole (PRILOSEC) 20 MG delayed release capsule Take 20 mg by mouth dailyHistorical Med      ergocalciferol (ERGOCALCIFEROL) 1.25 MG (21192 UT) capsule Take 50,000 Units by mouth once a weekHistorical Med      nadolol (CORGARD) 20 MG tablet Take 20 mg by mouth nightlyHistorical Med      glipiZIDE (GLUCOTROL XL) 10 MG extended release tablet Take 1 tablet by mouth daily (with breakfast), Disp-30 tablet, R-2Adjust Sig       !! - Potential duplicate medications found. Please discuss with provider.          Discharge Medication List as of 4/15/2021  6:33 PM      STOP taking these medications       vitamin A 3 MG (82420 UT) capsule Comments:   Reason for Stopping:             Discharge Medication List as of 4/15/2021  6:33 PM      CONTINUE these medications which have NOT CHANGED    Details   atorvastatin (LIPITOR) 40 MG tablet Take 1 tablet by mouth daily, Disp-30 tablet, R-2Normal      !! furosemide (LASIX) 40 MG tablet Historical Med      !! furosemide (LASIX) 20 MG tablet Historical Med      spironolactone (ALDACTONE) 50 MG tablet Take 150 mg by mouth dailyHistorical Med      omeprazole (PRILOSEC) 20 MG delayed release capsule Take 20 mg by mouth dailyHistorical Med      ergocalciferol (ERGOCALCIFEROL) 1.25 MG (34497 UT) capsule Take 50,000 Units by mouth once a weekHistorical Med      nadolol (CORGARD) 20 MG tablet Take 20 mg by mouth nightlyHistorical Med      glipiZIDE (GLUCOTROL XL) 10 MG extended release tablet Take 1 tablet by mouth daily (with breakfast), Disp-30 tablet, R-2Adjust Sig       !! - Potential duplicate medications found. Please discuss with provider.            Activity: activity as tolerated  Diet: low fat, low cholesterol diet    Discharge instructions:   [address]  [address]  [address] [id number] [facility] [address] [facility]  [phone]    Call in 1 day  Hospital follow up    Rashawn Lemon MD  [address] [facility]  [phone]    Call in 3 days  Hospital follow up       Signed:  Michell Rivero DO  4/16/2021  9:01 AM

## 2021-04-16 NOTE — CARE COORDINATION
[address] Transitions Initial Follow Up Call    Call within 2 business days of discharge: Yes    Patient: Merlin Rodriguez Patient : 1950   MRN: [id number]  Reason for Admission: hepatic encephalopathy   Discharge Date: 4/15/21 RARS: Readmission Risk Score: 13      Last Discharge [facility]       Complaint Diagnosis Description Type Department Provider    21 Altered Mental Status Hepatic encephalopathy ([facility] [address] . .. ED to Hosp-Admission (Discharged) (ADMITTED) WISAM Arellano MD; Nikole Bond. .. Spoke with: Mary Aguayo, [address]: Harini Mcguire      Non-face-to-face services provided:  Scheduled appointment with PCP-MIYA REQUESTED AN APPT FOR NEXT FRIDAY, 2021, WAS ABLE TO SCHEDULE FOR 3:20 PM/  Obtained and reviewed discharge summary and/or continuity of care documents    Care Transitions 24 Hour Call    Schedule Follow Up Appointment with PCP: Completed  Do you have any ongoing symptoms?: No  Do you have a copy of your discharge instructions?: Yes  Do you have all of your prescriptions and are they filled?: Yes (Comment: [facility] Transplant team is working on getting Rifaximin)  Have you been contacted by a [address]?: No  Have you scheduled your follow up appointment?: No  Were you discharged with any Home Care or Post Acute Services: Yes  Post Acute Services: Home Health (Comment: [facility] )  Do you feel like you have everything you need to keep you well at home?: Yes  Care Transitions Interventions     Spoke with Tree's wife Jose Gold for low readmission risk score care transition call post hospital discharge. She reports that he is doing \"good\" today. She reports that his mentation and conversation is appropriate today. She stated that PT came this morning and nursing will also be coming. Med review completed, 1111F entered. He is taking the lactulose to produce bowel movements.  She reports speaking with the transplant team at [facility] earlier today and the nurse is going to try to get the Rifaximin through the  for him. Robbie Orozco stated Sunny Almonte has to have a colonoscopy soon and after that he will officially be on the transplant list.     Robbie Orozco denies any needs, questions, or concerns at this time and kindly declines follow up from [facility] as they follow closely with [facility].    Follow Up  Future Appointments   Date Time Provider Tommie Ortiz   4/20/2021  1:30 PM Drew Plasencia DPM Col Podiatry [facility]   4/23/2021  3:20 PM Gilford Langton,  [address]   7/2/2021  2:00 PM Gilford Langton,  [address]       Mina Lopez RN

## 2021-04-19 NOTE — TELEPHONE ENCOUNTER
Lanie with [facility] occupational therapy calling to update you. Patient is not in need of OT at this time and is doing well around the house but she is putting in for a  to try and help patient get some assistance in home after skilled nursing and PT are done.

## 2021-04-20 NOTE — LETTER
Flakita Blair MD   [address] [address]     Patient: Malcom Rodriguez  YOB: 1950  Date of Visit: 4/26/2021     Dear Flakita Blair MD    Thank you for referring Malcom Rodriguez to me for evaluation. Shereen Vasquez was seen diabetic foot ankle exam.  I did perform debridement nails and calluses today. We will follow-up 2 months to monitor overall progression. Once again, thank you very much for this kind referral and allowing me to participate in the care of this patient. If you have questions, please do not hesitate to call me.     Sincerely,        Bozena Borges, [facility]  [address], [facility] [address] [address]  Phone: [phone]  Fax: [phone]

## 2021-04-20 NOTE — PROGRESS NOTES
Chelsea Iglesias is here today for nail care. his PCP is Leena Jin MD last OV was 21.          21  Darliss Knock Channels : 1950 Sex: male  Age: 69 y.o. Patient was referred by Leena Jin MD    CC:   Diabetic foot exam and painful elongated toenails 1-5 right and left    HPI:   This pleasant 74-year-old male diabetic patient referred me today foot ankle exam.  Does have history diabetes controlled with medication. No previous history of diabetic inserts or diabetic shoes. Denies previous history of any diabetic wounds. Presents with his wife through entire visit. No current calf pain. Diabetic foot exam and painful elongated toenails 1-5 right and left. No additional pedal complaints at this time. ROS:  Const: Denies constitutional symptoms  Musculo: Denies symptoms other than stated above  Skin: Denies symptoms other than stated above      Current Outpatient Medications:     CONSTULOSE 10 GM/15ML solution, 30mL by mouth 2-3 times daily as needed. , Disp: 3 Bottle, Rfl: 2    rifaximin (XIFAXAN) 550 MG tablet, Take 1 tablet by mouth 2 times daily (Patient not taking: Reported on 2021), Disp: 60 tablet, Rfl: 0    atorvastatin (LIPITOR) 40 MG tablet, Take 1 tablet by mouth daily, Disp: 30 tablet, Rfl: 2    furosemide (LASIX) 40 MG tablet, 40 mg daily , Disp: , Rfl:     furosemide (LASIX) 20 MG tablet, Take 20 mg by mouth daily , Disp: , Rfl:     spironolactone (ALDACTONE) 50 MG tablet, Take 150 mg by mouth daily, Disp: , Rfl:     omeprazole (PRILOSEC) 20 MG delayed release capsule, Take 20 mg by mouth daily, Disp: , Rfl:     ergocalciferol (ERGOCALCIFEROL) 1.25 MG (40459 UT) capsule, Take 50,000 Units by mouth once a week, Disp: , Rfl:     nadolol (CORGARD) 20 MG tablet, Take 20 mg by mouth nightly, Disp: , Rfl:     glipiZIDE (GLUCOTROL XL) 10 MG extended release tablet, Take 1 tablet by mouth daily (with breakfast), Disp: 30 tablet, Rfl: 2  No Known Allergies    Past Medical History:   Diagnosis Date    Liver problem        There were no vitals filed for this visit. Work History/Social History: Foot and ankle history:     Focused Lower Extremity Physical Exam:      Neurovascular examination:    Dorsalis Pedis palpable bilateral.  Posterior tibialis non-palpable bilateral.    Capillary Refill Time:  Immediate return  Hair growth:  Symmetrical and bilateral   Skin:  Not atrophic  Edema: Mild edema bilateral feet. Mild edema bilateral ankles. Neurologic:  Light touch diminished bilateral.  Warm to coolness bilateral distal toes  Decreased epicritic sensation     Musculoskeletal/ Orthopedic examination:    Equinis: present bilateral  Dorsiflexion, plantarflexion, inversion, eversion bilateral 5 out of 5 muscle strength  Wiggling toes  Negative Homans  Flexible contracted hammertoes 2 through 5 bilateral    Dermatology examination:    Toenails 1 through 5 bilateral thickened, elongated, dystrophic, mycotic with subungual debris. Web spaces 1 through 4 bilateral clean dry and intact. Hyperkeratotic tissue noted plantar fifth metatarsal bilateral  No open skin lesions or abrasions                Assessment and Plan:  Livan Fox was seen today for nail problem, callouses and diabetes. Diagnoses and all orders for this visit:    Tinea unguium    Hereditary sensory neuropathy    Type 2 diabetes mellitus without complication, unspecified whether long term insulin use (HCC)    Corns and callosities    Hammer toes of both feet        Livan Fox seen new referral for diabetic foot ankle exam  Diabetic foot and ankle examination performed today  Formal debridement toenails 1 through 5 right and left with manual debridement for thickness and overall length. Paring hyperkeratotic tissue with a #15 blade plantar fifth metatarsal bilateral  Patient's wife present throughout entire visit. Avoid barefoot. Follow-up 2 months.     Return in about 2 months (around 6/20/2021). Seen By:  Bozena Borges DPM      Document was created using voice recognition software. Note was reviewed however may contain grammatical errors.

## 2021-04-23 NOTE — PROGRESS NOTES
Post-Discharge Transitional Care Management Services or Hospital Follow Up      Jaye Tovarjessica Michael   YOB: 1950    Date of Office Visit:  4/23/2021  Date of Hospital Admission: 4/12/21  Date of Hospital Discharge: 4/15/21  Risk of hospital readmission (high >=14%. Medium >=10%) :Readmission Risk Score: 13      Care management risk score Rising risk (score 2-5) and Complex Care (Scores >=6): 1     Non face to face  following discharge, date last encounter closed (first attempt may have been earlier): 4/16/2021  2:47 PM    Call initiated 2 business days of discharge: Yes    Patient Active Problem List   Diagnosis    Type 2 diabetes mellitus with diabetic polyneuropathy, without long-term current use of insulin ([facility] [address]    Advanced cirrhosis of liver ([facility] [address]    Hypercholesterolemia    Cryptogenic stroke ([facility] [address]    Cognitive impairment    Other ascites       No Known Allergies    Medications listed as ordered at the time of discharge from hospital   Tree Rodriguez   Home Medication Instructions ESTEVAN:    Printed on:04/23/21 1814   Medication Information                      atorvastatin (LIPITOR) 40 MG tablet  Take 1 tablet by mouth daily             CONSTULOSE 10 GM/15ML solution  30mL by mouth 2-3 times daily as needed.              ergocalciferol (ERGOCALCIFEROL) 1.25 MG (54240 UT) capsule  Take 50,000 Units by mouth once a week             furosemide (LASIX) 20 MG tablet  Take 20 mg by mouth daily              furosemide (LASIX) 40 MG tablet  40 mg daily              glipiZIDE (GLUCOTROL XL) 10 MG extended release tablet  Take 1 tablet by mouth daily (with breakfast)             nadolol (CORGARD) 20 MG tablet  Take 20 mg by mouth nightly             omeprazole (PRILOSEC) 20 MG delayed release capsule  Take 20 mg by mouth daily             rifaximin (XIFAXAN) 550 MG tablet  Take 1 tablet by mouth 2 times daily             spironolactone (ALDACTONE) 50 MG tablet  Take 150 mg by mouth daily Medications marked \"taking\" at this time  Outpatient Medications Marked as Taking for the 4/23/21 encounter (Office Visit) with Kieran Rodarte MD   Medication Sig Dispense Refill    CONSTULOSE 10 GM/15ML solution 30mL by mouth 2-3 times daily as needed. 3 Bottle 2    atorvastatin (LIPITOR) 40 MG tablet Take 1 tablet by mouth daily 30 tablet 2    furosemide (LASIX) 40 MG tablet 40 mg daily       furosemide (LASIX) 20 MG tablet Take 20 mg by mouth daily       spironolactone (ALDACTONE) 50 MG tablet Take 150 mg by mouth daily      omeprazole (PRILOSEC) 20 MG delayed release capsule Take 20 mg by mouth daily      ergocalciferol (ERGOCALCIFEROL) 1.25 MG (98280 UT) capsule Take 50,000 Units by mouth once a week      nadolol (CORGARD) 20 MG tablet Take 20 mg by mouth nightly      glipiZIDE (GLUCOTROL XL) 10 MG extended release tablet Take 1 tablet by mouth daily (with breakfast) 30 tablet 2        Medications patient taking as of now reconciled against medications ordered at time of hospital discharge: Yes    Chief Complaint   Patient presents with   [facility] Management     SEB 04/15/21 Hepatic Encephalopathy       History of Present illness - Follow up of Hospital diagnosis(es): Hepatic encephalopathy, cirrhosis, cirrhotic ascites    Inpatient course: Discharge summary reviewed- see chart. Hospital Course: Radha Munson is a 69 y.o. male who presented for evaluation of altered mental status. Patient was found to have hepatic encephalopathy due to cirrhosis from [facility]. Patient had an elevated ammonia and GI was consulted and lactic acidosis which resolved with fluids. Patient was placed on lactulose. He was slowly titrated up in order to produce a bowel movement and ammonia was trended. Patient slowly progressed and had multiple bowel movements resulting in decreased ammonia and improve mental status. He had also been receiving more frequent paracentesis and received 1 while hospitalized. Interventional radiology was able to drain 4 L during this time. He had an otherwise uneventful course and progressed well. Pain was controlled. He was tolerating a regular diet with no nausea or vomiting, was ambulating well, and was in a suitable condition for discharge to home instructed to follow-up with his PCP and transplant team in [place]. Interval history/Current status: Patient is of few words today, but does seem oriented and able to answer questions. His wife answers most of the questions. She thinks that he is more oriented and back to her recent baseline. She is giving him 90 mL of Constulose daily and that seems to be helping maintain his mentation but not causing diarrhea. He has had some loose stools but tolerable. No accidents. Since returning home, he has had an increased appetite. He is eating better. He had a 4 L paracentesis performed while in hospital and has another scheduled for next Friday. His meld score is 18 using recent data. Problems with hypotension, though he generally is asymptomatic. He has not had any presyncopal episodes or falls. Wife hesitant to give nadolol when blood pressure is low and she does have holding parameters for that. He has follow-up with [place] clinic upcoming. He is going to be having colonoscopy and upper endoscopy performed in preparation for getting on the liver transplant list.      A comprehensive review of systems was negative except for what was noted in the HPI. Vitals:    04/23/21 1513   BP: (!) 92/56   Site: Left Upper Arm   Position: Sitting   Cuff Size: Large Adult   Pulse: 56   Temp: 97.2 °F (36.2 °C)   TempSrc: Temporal   SpO2: 97%   Weight: 216 lb (98 kg)   Height: 6' 2\" (1.88 m)     Body mass index is 27.73 kg/m².    Wt Readings from Last 3 Encounters:   04/23/21 216 lb (98 kg)   04/20/21 213 lb (96.6 kg)   04/15/21 213 lb 11.2 oz (96.9 kg)     BP Readings from Last 3 Encounters:   04/23/21 (!) 92/56   04/15/21 100/60   04/02/21 114/66        Physical Exam:  General Appearance: alert and oriented to person, place and time, well developed and well- nourished, in no acute distress  Skin: warm and dry, no rash or erythema, positive jaundice, positive bruising  Head: normocephalic and atraumatic  Eyes: pupils equal, round, and reactive to light, extraocular eye movements intact, conjunctivae normal  ENT: tympanic membrane, external ear and ear canal normal bilaterally, nose without deformity, nasal mucosa and turbinates normal without polyps  Neck: supple and non-tender without mass, no thyromegaly or thyroid nodules, no cervical lymphadenopathy  Pulmonary/Chest: clear to auscultation bilaterally- no wheezes, rales or rhonchi, normal air movement, no respiratory distress  Cardiovascular: normal rate, regular rhythm, normal S1 and S2, no murmurs, rubs, clicks, or gallops, distal pulses intact, no carotid bruits  Abdomen: soft, non-tender, mildly-distended, normal bowel sounds, no masses or organomegaly  Extremities: no cyanosis, clubbing or edema  Musculoskeletal: normal range of motion, no joint swelling, deformity or tenderness  Neurologic: reflexes normal and symmetric, no cranial nerve deficit, gait, coordination and speech normal    Assessment/Plan:  1. Advanced cirrhosis of liver (HCC)  Meld score 18  Colonoscopy upcoming for liver transplant list  Upper endoscopy upcoming to check for varices  Continue nadolol as able with holding parameters given low blood pressure. Continue paracentesis as needed, trying to move towards every other week. - ID DISCHARGE MEDS RECONCILED W/ CURRENT OUTPATIENT MED LIST    2. Hepatic encephalopathy ([address]  Start rifaximin once received. Continue Constulose until then. May be able to decrease the amount of Constulose to help with bowel habits once rifaximin started. Ammonia level at next appointment and other labs as well to update meld score. Home health on board.   Continues with home physical therapy. Cleared by home occupational therapy.     Medical Decision Making: moderate complexity

## 2021-05-25 NOTE — TELEPHONE ENCOUNTER
[id number]          [phone]     She is calling to inform you that he is being discharged from Occupational Therapy services today.

## 2021-06-09 NOTE — TELEPHONE ENCOUNTER
Ellen is calling to let you know that today is the day for the patient to be recerted for home health-skilled nursing and home health aide.   She wants to know if you'll follow

## 2021-07-20 NOTE — PROGRESS NOTES
Medicare Annual Wellness Visit  Name: Rocio Núñez Date: 2021   MRN: [id number] Sex: Male   Age: 69 y.o. Ethnicity: Non- / Non    : 1950 Race: White (non-)      Madison Reid is here for Medicare AWV and Cirrhosis    Screenings for behavioral, psychosocial and functional/safety risks, and cognitive dysfunction are all negative except as indicated below. These results, as well as other patient data from the [address] form, are documented in Flowsheets linked to this Encounter. No Known Allergies      Prior to Visit Medications    Medication Sig Taking?  Authorizing Provider   atorvastatin (LIPITOR) 40 MG tablet Take 1 tablet by mouth daily Yes Rosa Farah MD   glipiZIDE (GLUCOTROL XL) 10 MG extended release tablet Take 1 tablet by mouth daily (with breakfast) Yes Rosa Farah MD   CONSTULOSE 10 GM/15ML solution TAKE 30ML BY MOUTH 2 TO 3  TIMES DAILY AS NEEDED Yes Rosa Farah MD   furosemide (LASIX) 40 MG tablet 40 mg daily  Yes Historical Provider, MD   furosemide (LASIX) 20 MG tablet Take 20 mg by mouth daily  Yes Historical Provider, MD   spironolactone (ALDACTONE) 50 MG tablet Take 150 mg by mouth daily Yes Historical Provider, MD   omeprazole (PRILOSEC) 20 MG delayed release capsule Take 20 mg by mouth daily Yes Historical Provider, MD   ergocalciferol (ERGOCALCIFEROL) 1.25 MG (93654 UT) capsule Take 50,000 Units by mouth once a week Yes Historical Provider, MD   nadolol (CORGARD) 20 MG tablet Take 20 mg by mouth nightly Yes Historical Provider, MD   atorvastatin (LIPITOR) 40 MG tablet Take 40 mg by mouth daily  Historical Provider, MD         Past Medical History:   Diagnosis Date    Liver problem        Past Surgical History:   Procedure Laterality Date    EYE SURGERY      HERNIA REPAIR           Family History   Problem Relation Age of Onset    Breast Cancer Mother     Heart Disease Mother     Emphysema Mother     Hemochromatosis Brother        CareTeam (Including outside providers/suppliers regularly involved in providing care):   Patient Care Team:  Kristian Alegira MD as PCP - General (Family Medicine)  Kristian Alegria MD as PCP - [facility]    Wt Readings from Last 3 Encounters:   07/20/21 205 lb 9.6 oz (93.3 kg)   04/23/21 216 lb (98 kg)   04/20/21 213 lb (96.6 kg)     Vitals:    07/20/21 1413   BP: (!) 106/54   Pulse: 68   Resp: 16   Temp: 97.3 °F (36.3 °C)   TempSrc: Temporal   SpO2: 94%   Weight: 205 lb 9.6 oz (93.3 kg)   Height: 6' (1.829 m)     Body mass index is 27.88 kg/m². Based upon direct observation of the patient, evaluation of cognition reveals recent and remote memory intact. General Appearance: alert and oriented to person, place and time, well developed and well- nourished, in no acute distress  Skin: warm and dry, no rash or erythema  Head: normocephalic and atraumatic  Eyes: pupils equal, round, and reactive to light, extraocular eye movements intact, conjunctivae normal  Neck: supple and non-tender without mass, no thyromegaly or thyroid nodules, no cervical lymphadenopathy  Pulmonary/Chest: clear to auscultation bilaterally- no wheezes, rales or rhonchi, normal air movement, no respiratory distress  Cardiovascular: normal rate, regular rhythm, normal S1 and S2, no murmurs, rubs, clicks, or gallops, distal pulses intact, no carotid bruits  Abdomen: soft, non-tender, mildly distended, normal bowel sounds, no masses or organomegaly  Extremities: no cyanosis, clubbing or edema  Musculoskeletal: normal range of motion, no joint swelling, deformity or tenderness  Skin: Jaundice, fragility with bruising. Patient's complete Health Risk Assessment and screening values have been reviewed and are found in Flowsheets. The following problems were reviewed today and where indicated follow up appointments were made and/or referrals ordered.     Positive Risk Factor Screenings with Interventions:     Fall Risk:  Timed Up and Go Test > 12 seconds? (Complete if either Fall Risk answers are Yes): no  2 or more falls in past year?: (!) yes  Fall with injury in past year?: no  Fall Risk Interventions:    · Home safety tips provided          General Health and ACP:  General  In general, how would you say your health is?: Fair  In the past 7 days, have you experienced any of the following? New or Increased Pain, New or Increased Fatigue, Loneliness, Social Isolation, Stress or Anger?: None of These  Do you get the social and emotional support that you need?: Yes  Do you have a Living Will?: Yes  Advance Directives     [facility]-Advance Directive ACP-Power of     Not on File Not on File Not on File Not on File      General Health Risk Interventions:  · No Living Will: Advance Care Planning addressed with patient today  Patient desires consistent with full code today. Health Habits/Nutrition:  Health Habits/Nutrition  Do you exercise for at least 20 minutes 2-3 times per week?: (!) No  Have you lost any weight without trying in the past 3 months?: (!) Yes  Do you eat only one meal per day?: (!) Yes  Have you seen the dentist within the past year?: Yes  Body mass index: (!) 27.88  Health Habits/Nutrition Interventions:  · Patient unable to improve exercise habits due to chronic medical condition.     Hearing/Vision:  No exam data present  Hearing/Vision  Do you or your family notice any trouble with your hearing that hasn't been managed with hearing aids?: No  Do you have difficulty driving, watching TV, or doing any of your daily activities because of your eyesight?: No  Have you had an eye exam within the past year?: (!) No (every 2 years)  Hearing/Vision Interventions:  · Vision concerns:  patient encouraged to make appointment with his/her eye specialist     ADL:  ADLs  In the past 7 days, did you need help from others to perform any of the following everyday activities? Eating, dressing, grooming, bathing, toileting, or walking/balance?: (!) Walking/Balance  In the past 7 days, did you need help from others to take care of any of the following? Laundry, housekeeping, banking/finances, shopping, telephone use, food preparation, transportation, or taking medications?: [employer], Housekeeping, Shopping, Transportation  ADL Interventions:  · Patient getting help from his family with these activities. Limited secondary to chronic medical conditions. Personalized Preventive Plan   Current Health Maintenance Status  Immunization History   Administered Date(s) Administered    COVID-19, Moderna, PF, 100mcg/0.5mL 06/07/2021, 07/06/2021    Hepatitis B Adult (Heplisav-b) 12/08/2020    Influenza, High-dose, Quadv, 65 yrs +, IM (Fluzone) 12/08/2020    Pneumococcal Conjugate 13-valent ([id number]) 12/08/2020    Tdap (Boostrix, Adacel) 12/08/2020        Health Maintenance   Topic Date Due    Hepatitis C screen  Never done    Hepatitis A vaccine (1 of 2 - Risk 2-dose series) Never done    Diabetic foot exam  Never done    Diabetic retinal exam  Never done    Lipid screen  Never done    Diabetic microalbuminuria test  Never done    Shingles Vaccine (1 of 2) Never done    Annual Wellness Visit (AWV)  Never done    A1C test (Diabetic or Prediabetic)  08/03/2021    Flu vaccine (1) 09/01/2021    Potassium monitoring  04/15/2022    Creatinine monitoring  04/15/2022    DTaP/Tdap/Td vaccine (2 - Td or Tdap) 12/08/2030    Colon cancer screen colonoscopy  05/06/2031    Pneumococcal 65+ years Vaccine  Completed    COVID-19 Vaccine  Completed    Hib vaccine  Aged Out    Meningococcal (ACWY) vaccine  Aged Out     Recommendations for [employer] Due: see orders and patient instructions/AVS.  .   Recommended screening schedule for the next 5-10 years is provided to the patient in written form: see Patient Instructions/AVS.    Jos Santos was seen today for medicare awv and cirrhosis. Diagnoses and all orders for this visit:    Routine general medical examination at a health care facility  Overall [address] Castellano is doing well. Age appropriate HM topics reviewed and updated where agreeable. Vaccines reviewed and updated where agreeable. Age appropriate anticipatory guidance discussed. Encouraged to work on [employer] and exercise strategies. Opportunity to ask questions and answers provided as able. Continues following with [facility] for potential liver transplant. Recommend RTO in 1 year for annual physical.     Hypercholesterolemia  -     atorvastatin (LIPITOR) 40 MG tablet; Take 1 tablet by mouth daily    Type 2 diabetes mellitus with diabetic polyneuropathy, without long-term current use of insulin (HCC)  -Continue   glipiZIDE (GLUCOTROL XL) 10 MG extended release tablet; Take 1 tablet by mouth daily (with breakfast)  -     CBC Auto Differential; Future  -     Comprehensive Metabolic Panel; Future  -     Lipid Panel; Future  -     Microalbumin / Creatinine Urine Ratio;  Future  -     Hemoglobin A1C; Future  Update labs

## 2021-07-20 NOTE — PATIENT INSTRUCTIONS
Personalized Preventive Plan for Domitila Rodriguez - 7/20/2021  Medicare offers a range of preventive health benefits. Some of the tests and screenings are paid in full while other may be subject to a deductible, co-insurance, and/or copay. Some of these benefits include a comprehensive review of your medical history including lifestyle, illnesses that may run in your family, and various assessments and screenings as appropriate. After reviewing your medical record and screening and assessments performed today your provider may have ordered immunizations, labs, imaging, and/or referrals for you. A list of these orders (if applicable) as well as your Preventive Care list are included within your After Visit Summary for your review. Other Preventive Recommendations:    · A preventive eye exam performed by an eye specialist is recommended every 1-2 years to screen for glaucoma; cataracts, macular degeneration, and other eye disorders. · A preventive dental visit is recommended every 6 months. · Try to get at least 150 minutes of exercise per week or 10,000 steps per day on a pedometer . · Order or download the FREE \"Exercise & Physical Activity: Your Everyday Guide\" from [employer]. Call 6-757.909.7620 or search [employer] online. · You need 6654-2196 mg of calcium and 5777-6747 IU of vitamin D per day. It is possible to meet your calcium requirement with diet alone, but a vitamin D supplement is usually necessary to meet this goal.  · When exposed to the sun, use a sunscreen that protects against both UVA and UVB radiation with an SPF of 30 or greater. Reapply every 2 to 3 hours or after sweating, drying off with a towel, or swimming. · Always wear a seat belt when traveling in a car. Always wear a helmet when riding a bicycle or motorcycle.

## 2021-09-09 NOTE — TELEPHONE ENCOUNTER
Last Appointment:  7/20/2021  Future Appointments   Date Time Provider Tommie Ortiz   11/19/2021 11:00 AM Terrell Luna  [address]

## 2021-09-13 NOTE — TELEPHONE ENCOUNTER
Last Appointment:  7/20/2021  Future Appointments   Date Time Provider Tommie Ortiz   11/19/2021 11:00 AM Rosa Farah  [address] [place] [address]

## 2021-09-27 NOTE — TELEPHONE ENCOUNTER
Jade Lynn calling to get a Hospital Follow up scheduled for him. He was admitted at [facility] for mental confusion and abnormal labs. He was discharged on Saturday and needs seen in about a week. Do you see a place I can put him?

## 2021-09-28 NOTE — TELEPHONE ENCOUNTER
We had a cancellation at 3pm today if they are able to make it. Otherwise might be Friday afternoon in one of the two blocked spots for things like this.

## 2021-10-04 NOTE — TELEPHONE ENCOUNTER
----- Message from Jose Zhong sent at 9/29/2021  4:47 PM EDT -----  Subject: Message to Provider    QUESTIONS  Information for Provider? [address] called regarding   patient being discharged on 09/20 and needs a response from the doctor   before they can get him scheduled for home care.   ---------------------------------------------------------------------------  --------------  [address]  What is the best way for the office to contact you? OK to leave message on   voicemail  Preferred Call Back Phone Number? [phone]  ---------------------------------------------------------------------------  --------------  SCRIPT ANSWERS  Relationship to Patient? Third Party  Representative Name?  [id number]

## 2021-10-04 NOTE — TELEPHONE ENCOUNTER
Yes, I am covering for Dr. Lyndon Lennox  It is unclear if Dr. Lyndon Lennox was aware of this hospitalization or not  Recommend they schedule a sooner hospital follow up if Dr. Lyndon Lennox was not [facility] at the July appointment he had with him so Dr. Lyndon Lennox can be updated

## 2021-10-07 NOTE — TELEPHONE ENCOUNTER
Luz Elena Ramirez called back and was advised of the reason for the call. She said that          Vince Bailey is still admitted and not sure when he will be discharged. His condition is not good right now so she does not want to schedule a hospital follow up at this time.

## 2021-10-13 NOTE — TELEPHONE ENCOUNTER
Yu Amador calling to ask if you will follow orders for this patient coming out of [facility]? He will be discharged to home tomorrow. He was seen for Hepatic Encephalopathy. They have orders for [address].

## 2021-10-18 NOTE — TELEPHONE ENCOUNTER
Patient released from [address] [place] [address] on 10/15, he was in for [place]. Will you follow for  Nursing, PT and OT? Also on his  med rec there is a Severe med interaction between cipro&calcium carbonate. the cipro will be  less effective.

## 2021-10-18 NOTE — TELEPHONE ENCOUNTER
Marlene, A physical therapist with [facility] home care calling to say that patient was evaluated today and will be seen twice this week then once weekly for two weeks then re evaluated if any questions she can be reached at [phone]

## 2021-10-18 NOTE — TELEPHONE ENCOUNTER
Yes will follow for skilled services. Hold the calcium supplement while on the Cipro. Resume the calcium supplement when done with the cipro.

## 2021-10-21 NOTE — TELEPHONE ENCOUNTER
Marlene a Physical Therapist with [facility] homecare calling to alert you; PT canceled for this afternoon. Wife took him to ED. I called mrs channels and left message to return call.